# Patient Record
Sex: FEMALE | Race: WHITE | Employment: OTHER | URBAN - METROPOLITAN AREA
[De-identification: names, ages, dates, MRNs, and addresses within clinical notes are randomized per-mention and may not be internally consistent; named-entity substitution may affect disease eponyms.]

---

## 2017-03-11 ENCOUNTER — HOSPITAL ENCOUNTER (INPATIENT)
Facility: HOSPITAL | Age: 82
LOS: 3 days | Discharge: HOME HEALTH | DRG: 308 | End: 2017-03-16
Attending: EMERGENCY MEDICINE | Admitting: INTERNAL MEDICINE
Payer: MEDICARE

## 2017-03-11 ENCOUNTER — APPOINTMENT (OUTPATIENT)
Dept: GENERAL RADIOLOGY | Facility: HOSPITAL | Age: 82
DRG: 308 | End: 2017-03-11
Attending: EMERGENCY MEDICINE
Payer: MEDICARE

## 2017-03-11 DIAGNOSIS — N39.0 URINARY TRACT INFECTION, SITE UNSPECIFIED: ICD-10-CM

## 2017-03-11 DIAGNOSIS — R53.1 WEAKNESS GENERALIZED: Primary | ICD-10-CM

## 2017-03-11 PROBLEM — D64.9 ANEMIA: Status: ACTIVE | Noted: 2017-03-11

## 2017-03-11 PROBLEM — R79.89 AZOTEMIA: Status: ACTIVE | Noted: 2017-03-11

## 2017-03-11 PROBLEM — E87.1 HYPONATREMIA: Status: ACTIVE | Noted: 2017-03-11

## 2017-03-11 PROBLEM — R73.9 HYPERGLYCEMIA: Status: ACTIVE | Noted: 2017-03-11

## 2017-03-11 LAB
ALBUMIN SERPL-MCNC: 3.7 G/DL (ref 3.5–4.8)
ALP LIVER SERPL-CCNC: 95 U/L (ref 55–142)
ALT SERPL-CCNC: 17 U/L (ref 14–54)
APTT PPP: 28.6 SECONDS (ref 25–34)
AST SERPL-CCNC: 17 U/L (ref 15–41)
BASOPHILS # BLD AUTO: 0.01 X10(3) UL (ref 0–0.1)
BASOPHILS NFR BLD AUTO: 0.1 %
BILIRUB SERPL-MCNC: 0.4 MG/DL (ref 0.1–2)
BILIRUB UR QL STRIP.AUTO: NEGATIVE
BUN BLD-MCNC: 24 MG/DL (ref 8–20)
CALCIUM BLD-MCNC: 9 MG/DL (ref 8.3–10.3)
CHLORIDE: 100 MMOL/L (ref 101–111)
CO2: 24 MMOL/L (ref 22–32)
COLOR UR AUTO: YELLOW
CREAT BLD-MCNC: 0.78 MG/DL (ref 0.55–1.02)
EOSINOPHIL # BLD AUTO: 0 X10(3) UL (ref 0–0.3)
EOSINOPHIL NFR BLD AUTO: 0 %
ERYTHROCYTE [DISTWIDTH] IN BLOOD BY AUTOMATED COUNT: 13.7 % (ref 11.5–16)
GLUCOSE BLD-MCNC: 137 MG/DL (ref 70–99)
GLUCOSE UR STRIP.AUTO-MCNC: NEGATIVE MG/DL
HAV IGM SER QL: 2 MG/DL (ref 1.7–3)
HCT VFR BLD AUTO: 35.8 % (ref 34–50)
HGB BLD-MCNC: 11.8 G/DL (ref 12–16)
IMMATURE GRANULOCYTE COUNT: 0.02 X10(3) UL (ref 0–1)
IMMATURE GRANULOCYTE RATIO %: 0.2 %
KETONES UR STRIP.AUTO-MCNC: 20 MG/DL
LACTIC ACID: 1 MMOL/L (ref 0.5–2)
LYMPHOCYTES # BLD AUTO: 0.58 X10(3) UL (ref 0.9–4)
LYMPHOCYTES NFR BLD AUTO: 6.2 %
M PROTEIN MFR SERPL ELPH: 6.6 G/DL (ref 6.1–8.3)
MCH RBC QN AUTO: 29.1 PG (ref 27–33.2)
MCHC RBC AUTO-ENTMCNC: 33 G/DL (ref 31–37)
MCV RBC AUTO: 88.2 FL (ref 81–100)
MONOCYTES # BLD AUTO: 1.02 X10(3) UL (ref 0.1–0.6)
MONOCYTES NFR BLD AUTO: 10.8 %
NEUTROPHIL ABS PRELIM: 7.79 X10 (3) UL (ref 1.3–6.7)
NEUTROPHILS # BLD AUTO: 7.79 X10(3) UL (ref 1.3–6.7)
NEUTROPHILS NFR BLD AUTO: 82.7 %
NITRITE UR QL STRIP.AUTO: POSITIVE
PH UR STRIP.AUTO: 5 [PH] (ref 4.5–8)
PLATELET # BLD AUTO: 176 10(3)UL (ref 150–450)
POTASSIUM SERPL-SCNC: 4.1 MMOL/L (ref 3.6–5.1)
PROT UR STRIP.AUTO-MCNC: NEGATIVE MG/DL
RBC # BLD AUTO: 4.06 X10(6)UL (ref 3.8–5.1)
RED CELL DISTRIBUTION WIDTH-SD: 43.9 FL (ref 35.1–46.3)
SODIUM SERPL-SCNC: 135 MMOL/L (ref 136–144)
SP GR UR STRIP.AUTO: 1.02 (ref 1–1.03)
TROPONIN: 0.05 NG/ML (ref ?–0.05)
TSI SER-ACNC: 0.47 MIU/ML (ref 0.35–5.5)
UROBILINOGEN UR STRIP.AUTO-MCNC: <2 MG/DL
WBC # BLD AUTO: 9.4 X10(3) UL (ref 4–13)

## 2017-03-11 PROCEDURE — 87430 STREP A AG IA: CPT | Performed by: EMERGENCY MEDICINE

## 2017-03-11 PROCEDURE — 84484 ASSAY OF TROPONIN QUANT: CPT | Performed by: EMERGENCY MEDICINE

## 2017-03-11 PROCEDURE — 93005 ELECTROCARDIOGRAM TRACING: CPT

## 2017-03-11 PROCEDURE — 87040 BLOOD CULTURE FOR BACTERIA: CPT | Performed by: EMERGENCY MEDICINE

## 2017-03-11 PROCEDURE — 99291 CRITICAL CARE FIRST HOUR: CPT

## 2017-03-11 PROCEDURE — 93010 ELECTROCARDIOGRAM REPORT: CPT

## 2017-03-11 PROCEDURE — 81001 URINALYSIS AUTO W/SCOPE: CPT | Performed by: EMERGENCY MEDICINE

## 2017-03-11 PROCEDURE — 71020 XR CHEST PA + LAT CHEST (CPT=71020): CPT

## 2017-03-11 PROCEDURE — 96375 TX/PRO/DX INJ NEW DRUG ADDON: CPT

## 2017-03-11 PROCEDURE — 87999 UNLISTED MICROBIOLOGY PX: CPT

## 2017-03-11 PROCEDURE — 83735 ASSAY OF MAGNESIUM: CPT | Performed by: EMERGENCY MEDICINE

## 2017-03-11 PROCEDURE — 87502 INFLUENZA DNA AMP PROBE: CPT | Performed by: EMERGENCY MEDICINE

## 2017-03-11 PROCEDURE — 83605 ASSAY OF LACTIC ACID: CPT | Performed by: EMERGENCY MEDICINE

## 2017-03-11 PROCEDURE — 80053 COMPREHEN METABOLIC PANEL: CPT | Performed by: EMERGENCY MEDICINE

## 2017-03-11 PROCEDURE — 87088 URINE BACTERIA CULTURE: CPT | Performed by: EMERGENCY MEDICINE

## 2017-03-11 PROCEDURE — 87186 SC STD MICRODIL/AGAR DIL: CPT | Performed by: EMERGENCY MEDICINE

## 2017-03-11 PROCEDURE — 87086 URINE CULTURE/COLONY COUNT: CPT | Performed by: EMERGENCY MEDICINE

## 2017-03-11 PROCEDURE — 87798 DETECT AGENT NOS DNA AMP: CPT | Performed by: EMERGENCY MEDICINE

## 2017-03-11 PROCEDURE — 85730 THROMBOPLASTIN TIME PARTIAL: CPT | Performed by: EMERGENCY MEDICINE

## 2017-03-11 PROCEDURE — 96365 THER/PROPH/DIAG IV INF INIT: CPT

## 2017-03-11 PROCEDURE — 84443 ASSAY THYROID STIM HORMONE: CPT | Performed by: EMERGENCY MEDICINE

## 2017-03-11 PROCEDURE — 85025 COMPLETE CBC W/AUTO DIFF WBC: CPT | Performed by: EMERGENCY MEDICINE

## 2017-03-11 PROCEDURE — 36415 COLL VENOUS BLD VENIPUNCTURE: CPT

## 2017-03-11 PROCEDURE — 87081 CULTURE SCREEN ONLY: CPT | Performed by: EMERGENCY MEDICINE

## 2017-03-11 RX ORDER — HEPARIN SODIUM AND DEXTROSE 10000; 5 [USP'U]/100ML; G/100ML
12 INJECTION INTRAVENOUS ONCE
Status: COMPLETED | OUTPATIENT
Start: 2017-03-11 | End: 2017-03-12

## 2017-03-11 RX ORDER — HEPARIN SODIUM AND DEXTROSE 10000; 5 [USP'U]/100ML; G/100ML
INJECTION INTRAVENOUS CONTINUOUS
Status: CANCELLED | OUTPATIENT
Start: 2017-03-12

## 2017-03-11 RX ORDER — DILTIAZEM HYDROCHLORIDE 5 MG/ML
10 INJECTION INTRAVENOUS
Status: ACTIVE | OUTPATIENT
Start: 2017-03-11 | End: 2017-03-12

## 2017-03-11 RX ORDER — DILTIAZEM HYDROCHLORIDE 5 MG/ML
INJECTION INTRAVENOUS
Status: COMPLETED
Start: 2017-03-11 | End: 2017-03-11

## 2017-03-11 RX ORDER — SODIUM CHLORIDE 9 MG/ML
1000 INJECTION, SOLUTION INTRAVENOUS CONTINUOUS
Status: ACTIVE | OUTPATIENT
Start: 2017-03-11 | End: 2017-03-12

## 2017-03-11 RX ORDER — SODIUM CHLORIDE 9 MG/ML
INJECTION, SOLUTION INTRAVENOUS ONCE
Status: DISCONTINUED | OUTPATIENT
Start: 2017-03-11 | End: 2017-03-16

## 2017-03-11 RX ORDER — HEPARIN SODIUM 5000 [USP'U]/ML
60 INJECTION INTRAVENOUS; SUBCUTANEOUS ONCE
Status: COMPLETED | OUTPATIENT
Start: 2017-03-11 | End: 2017-03-11

## 2017-03-12 ENCOUNTER — APPOINTMENT (OUTPATIENT)
Dept: CV DIAGNOSTICS | Facility: HOSPITAL | Age: 82
DRG: 308 | End: 2017-03-12
Attending: INTERNAL MEDICINE
Payer: MEDICARE

## 2017-03-12 LAB
APTT PPP: 129.7 SECONDS (ref 25–34)
APTT PPP: 74 SECONDS (ref 25–34)
BASOPHILS # BLD AUTO: 0.01 X10(3) UL (ref 0–0.1)
BASOPHILS NFR BLD AUTO: 0.1 %
BUN BLD-MCNC: 22 MG/DL (ref 8–20)
CALCIUM BLD-MCNC: 7.7 MG/DL (ref 8.3–10.3)
CHLORIDE: 107 MMOL/L (ref 101–111)
CO2: 20 MMOL/L (ref 22–32)
CREAT BLD-MCNC: 0.57 MG/DL (ref 0.55–1.02)
EOSINOPHIL # BLD AUTO: 0 X10(3) UL (ref 0–0.3)
EOSINOPHIL NFR BLD AUTO: 0 %
ERYTHROCYTE [DISTWIDTH] IN BLOOD BY AUTOMATED COUNT: 13.6 % (ref 11.5–16)
GLUCOSE BLD-MCNC: 82 MG/DL (ref 70–99)
HCT VFR BLD AUTO: 34.2 % (ref 34–50)
HGB BLD-MCNC: 11.3 G/DL (ref 12–16)
IMMATURE GRANULOCYTE COUNT: 0.04 X10(3) UL (ref 0–1)
IMMATURE GRANULOCYTE RATIO %: 0.5 %
LYMPHOCYTES # BLD AUTO: 1.09 X10(3) UL (ref 0.9–4)
LYMPHOCYTES NFR BLD AUTO: 13.1 %
MCH RBC QN AUTO: 29.5 PG (ref 27–33.2)
MCHC RBC AUTO-ENTMCNC: 33 G/DL (ref 31–37)
MCV RBC AUTO: 89.3 FL (ref 81–100)
MONOCYTES # BLD AUTO: 0.79 X10(3) UL (ref 0.1–0.6)
MONOCYTES NFR BLD AUTO: 9.5 %
NEUTROPHIL ABS PRELIM: 6.38 X10 (3) UL (ref 1.3–6.7)
NEUTROPHILS # BLD AUTO: 6.38 X10(3) UL (ref 1.3–6.7)
NEUTROPHILS NFR BLD AUTO: 76.8 %
PLATELET # BLD AUTO: 153 10(3)UL (ref 150–450)
POTASSIUM SERPL-SCNC: 4 MMOL/L (ref 3.6–5.1)
RBC # BLD AUTO: 3.83 X10(6)UL (ref 3.8–5.1)
RED CELL DISTRIBUTION WIDTH-SD: 44.1 FL (ref 35.1–46.3)
SODIUM SERPL-SCNC: 138 MMOL/L (ref 136–144)
TROPONIN: 0.1 NG/ML (ref ?–0.05)
WBC # BLD AUTO: 8.3 X10(3) UL (ref 4–13)

## 2017-03-12 PROCEDURE — 93306 TTE W/DOPPLER COMPLETE: CPT | Performed by: INTERNAL MEDICINE

## 2017-03-12 PROCEDURE — 85730 THROMBOPLASTIN TIME PARTIAL: CPT | Performed by: HOSPITALIST

## 2017-03-12 PROCEDURE — 80048 BASIC METABOLIC PNL TOTAL CA: CPT | Performed by: HOSPITALIST

## 2017-03-12 PROCEDURE — 93005 ELECTROCARDIOGRAM TRACING: CPT

## 2017-03-12 PROCEDURE — 97530 THERAPEUTIC ACTIVITIES: CPT

## 2017-03-12 PROCEDURE — 93010 ELECTROCARDIOGRAM REPORT: CPT | Performed by: INTERNAL MEDICINE

## 2017-03-12 PROCEDURE — 97167 OT EVAL HIGH COMPLEX 60 MIN: CPT

## 2017-03-12 PROCEDURE — 97162 PT EVAL MOD COMPLEX 30 MIN: CPT

## 2017-03-12 PROCEDURE — 93306 TTE W/DOPPLER COMPLETE: CPT

## 2017-03-12 PROCEDURE — 85025 COMPLETE CBC W/AUTO DIFF WBC: CPT | Performed by: HOSPITALIST

## 2017-03-12 PROCEDURE — 84484 ASSAY OF TROPONIN QUANT: CPT | Performed by: INTERNAL MEDICINE

## 2017-03-12 PROCEDURE — 97535 SELF CARE MNGMENT TRAINING: CPT

## 2017-03-12 RX ORDER — ENOXAPARIN SODIUM 100 MG/ML
40 INJECTION SUBCUTANEOUS DAILY
Status: DISCONTINUED | OUTPATIENT
Start: 2017-03-12 | End: 2017-03-12

## 2017-03-12 RX ORDER — DIPHENHYDRAMINE HCL 50 MG
50 CAPSULE ORAL NIGHTLY PRN
COMMUNITY
End: 2017-05-04

## 2017-03-12 RX ORDER — AMIODARONE HYDROCHLORIDE 200 MG/1
200 TABLET ORAL 3 TIMES DAILY
Status: DISCONTINUED | OUTPATIENT
Start: 2017-03-12 | End: 2017-03-16

## 2017-03-12 RX ORDER — HEPARIN SODIUM AND DEXTROSE 10000; 5 [USP'U]/100ML; G/100ML
INJECTION INTRAVENOUS CONTINUOUS PRN
Status: DISCONTINUED | OUTPATIENT
Start: 2017-03-12 | End: 2017-03-16

## 2017-03-12 RX ORDER — ASPIRIN 81 MG/1
81 TABLET ORAL DAILY
Status: DISCONTINUED | OUTPATIENT
Start: 2017-03-12 | End: 2017-03-16

## 2017-03-12 RX ORDER — BENZONATATE 200 MG/1
200 CAPSULE ORAL 3 TIMES DAILY PRN
Status: DISCONTINUED | OUTPATIENT
Start: 2017-03-12 | End: 2017-03-13

## 2017-03-12 RX ORDER — ACETAMINOPHEN 325 MG/1
650 TABLET ORAL EVERY 6 HOURS PRN
Status: DISCONTINUED | OUTPATIENT
Start: 2017-03-12 | End: 2017-03-12

## 2017-03-12 RX ORDER — MAGNESIUM OXIDE 400 MG (241.3 MG MAGNESIUM) TABLET
2 TABLET NIGHTLY PRN
COMMUNITY

## 2017-03-12 RX ORDER — ACETAMINOPHEN 325 MG/1
650 TABLET ORAL EVERY 4 HOURS PRN
Status: DISCONTINUED | OUTPATIENT
Start: 2017-03-12 | End: 2017-03-16

## 2017-03-12 NOTE — CM/SW NOTE
03/12/17 1200   CM/SW Referral Data   Referral Source Physician   Reason for Referral Discharge planning;Psychoscial assessment   Informant Patient   Pertinent Medical Hx   Primary Care Physician Name dr Elder Guillory Past Medical/Mental He

## 2017-03-12 NOTE — PLAN OF CARE
NURSING ADMISSION NOTE      Patient admitted via Cart. Oriented to room. Safety precautions initiated. Bed in low position. Call light in reach. Admission navigator completed.

## 2017-03-12 NOTE — PHYSICAL THERAPY NOTE
PHYSICAL THERAPY EVALUATION - INPATIENT     Room Number: 0801/9993-T  Evaluation Date: 3/12/2017  Type of Evaluation: Initial  Physician Order: PT Eval and Treat    Presenting Problem: generalized weakness  Reason for Therapy: Mobility Dysfunction and receives assist with ADL, uses RW and does walk some alone in building and outside using RW    SUBJECTIVE  Reports that she does lean back with initial standing but that is why she has help     Patient self-stated goal is return home to therapy at [address] [place] [address] STATUS  Gait Assessment   Gait Assistance: Maximum assistance  Distance (ft): 15  Assistive Device: Rolling walker  Pattern: Shuffle  Stoop/Curb Assistance: Not tested       Skilled Therapy Provided: PT evaluation completed.   RW gait in room with CGA   Ret

## 2017-03-12 NOTE — ED PROVIDER NOTES
Patient Seen in: [facility] Emergency Department    History   Patient presents with:  Fatigue (constitutional, neurologic)  Fever Sepsis (infectious)    Stated Complaint: WEAKNESS/FEVER    HPI    Patient is an 42-year-old female comes into emergency mouth.   LONGS ADULT LOW STRENGTH ASA 81 MG OR TBEC,  1 TABLET DAILY   FISH OIL,  1 DAILY   CALCIUM + D OR,  Take 2 Tabs by mouth daily.        Family History   Problem Relation Age of Onset   • duodenal carcinoma[other] [OTHER] Mother    • Cancer Mother calf tenderness. Dorsalis and Posterior Tibial pulses present. No clubbing. No cyanosis. 1+ bilateral lower extremity edema  SKIN EXAMINATIoN: Warm and dry with normal appearance. No rashes or lesions. NEUROLOGICAL:  Motor strength intact all groups. axes as noted on EKG Report. Rate: 80  Rhythm: Sinus Rhythm  Reading: Sinus arrhythmia without acute change          EKG #2 EKG  Rate, Axis and intervals as noted. I agree with computer interpretation. Rate: 130  Rhythm: Atrial fibrillation  Reading:  At cardiology, Dr. Lobo Holt. Patient denies history of atrial fibrillation. MDM     66-year-old female with generalized weakness secondary to urinary tract infection. Normal lactate.   Attempted to ambulate patient and apparently she was unsteady with her wal

## 2017-03-12 NOTE — ED NOTES
Family remains at the bedside, patient sleeping, arouses easily and denies c/o at this time, respirations normal, skin p/w/d

## 2017-03-12 NOTE — ED NOTES
Patient able to stand and walk with walker but her gait is unsteady. She states she is feeling better but remains weak.

## 2017-03-12 NOTE — CONSULTS
[facility] [address] [place] [address] Cardiology  Report of Consultation    Saul Rocha Patient Status:  Inpatient    1929 MRN [id number]   [facility] 3NE-A Attending Laura Richard MD   Hosp Day # 1 PCP Jason Lundberg MD     Reason for C duodenal carcinoma[other] [OTHER] Mother    • Cancer Mother      stomach   • melanoma[other] [OTHER] Brother      X2   • Cancer Brother      melanoma and pancreatic   • colon cancer[other] [OTHER] Other      M A x3, MU x1   • Cancer Father      melanoma kg)  08/16/16 : 127 lb (57.607 kg)          General: Well developed, well nourished elderly female. Pt is in no acute distress. HEENT:   Normocephalic. Atraumatic. Eyes with no scleral icterus. Neck: Supple. No JVD.   Carotids 2+ and equal in symmetri Past H/O TIA per records - pt / family do not recall  4. UTI  5. Anemia    -Stable  6. Troponin minimally elevated      Plan:  1. Amio to maintain SR  2.  Beta-blockade  3. Tele monitor  4. TFTs  5.  Echo  6. IV Heparin at this time  7.   Will contin

## 2017-03-12 NOTE — ED INITIAL ASSESSMENT (HPI)
Pt to ed from home assisted living with c/o weakness,fever, pt sx present last 24 hrs,pt also has sore throat.

## 2017-03-12 NOTE — OCCUPATIONAL THERAPY NOTE
OCCUPATIONAL THERAPY EVALUATION - INPATIENT     Room Number: 3417/8379-G  Evaluation Date: 3/12/2017  Type of Evaluation: Quick Eval  Presenting Problem: gerneralized weakness    Physician Order: IP Consult to Occupational Therapy  Reason for Therapy: ADL/ chair  Other Equipment: Hospital bed    Occupation/Status: retired  Hand Dominance: Right  Drives: No  Patient Regularly Uses: None    Prior Level of [place]: Pt reports receiving assistance for dressing, bathing, toileting, grooming and IADLs.   Pt re brushing teeth?: A Little  -   Eating meals?: A Little    AM-PAC Score:  Score: 16  Approx Degree of Impairment: 53.32%  Standardized Score (AM-PAC Scale): 35.96  CMS Modifier (G-Code): CK    FUNCTIONAL TRANSFER ASSESSMENT  Supine to Sit : Minimum assistan previous function level  Pt performs Bathing at previous function level

## 2017-03-12 NOTE — ED NOTES
Family at the bedside. Patient denies c/o palpitations or ELISHA. MD notified of change in cardiac rhythm.

## 2017-03-12 NOTE — PLAN OF CARE
Pt alert and oriented, some forgetfulness noted at times. Denies pain. No c/o dizziness, SOB, nausea. Cardizem gtt dc'd per cardiology. Heparin gtt, IVF infusing. NSR on tele. Afebrile. Echo completed. Will monitor closely.     CARDIOVASCULAR - ADULT    • M

## 2017-03-12 NOTE — H&P
[facility] Hospitalist History and Physical      Patient presents with:  Fatigue (constitutional, neurologic)  Fever Sepsis (infectious)       PCP: Maximus Mccabe MD      History of Present Illness: Patient is a 80year old female with PMH sig for osteopo on her record. Was treated with this post oral surgery. Fosamax                     Comment:Does not remember reaction it was a long time ago.   Macrobid [employer]*    Other (See Comments)    Comment:Reports severe abd cramping and fatigue Clear to auscultation bilaterally. Normal effort   Chest wall:  No tenderness or deformity. Heart:  Irregular, S1/S2 normal no murmur, rub or gallop appreciated   Abdomen:   Soft, +suprapubic tenderness. Bowel sounds present.   No masses,  No organomegaly performed 11/10/11.      3/11/2017  CONCLUSION:  1. Diffuse increased interstitial opacities most likely chronic, a superimposed edematous or infectious process cannot be excluded, correlate clinically. No focal consolidation appreciated. Details as above.

## 2017-03-13 ENCOUNTER — APPOINTMENT (OUTPATIENT)
Dept: GENERAL RADIOLOGY | Facility: HOSPITAL | Age: 82
DRG: 308 | End: 2017-03-13
Attending: HOSPITALIST
Payer: MEDICARE

## 2017-03-13 LAB
APTT PPP: 51.1 SECONDS (ref 25–34)
APTT PPP: 82.6 SECONDS (ref 25–34)
ATRIAL RATE: 153 BPM
ATRIAL RATE: 153 BPM
ATRIAL RATE: 80 BPM
ATRIAL RATE: 95 BPM
BASOPHILS # BLD AUTO: 0.01 X10(3) UL (ref 0–0.1)
BASOPHILS NFR BLD AUTO: 0.2 %
BUN BLD-MCNC: 17 MG/DL (ref 8–20)
CALCIUM BLD-MCNC: 7.4 MG/DL (ref 8.3–10.3)
CHLORIDE: 108 MMOL/L (ref 101–111)
CO2: 19 MMOL/L (ref 22–32)
CREAT BLD-MCNC: 0.54 MG/DL (ref 0.55–1.02)
EOSINOPHIL # BLD AUTO: 0 X10(3) UL (ref 0–0.3)
EOSINOPHIL NFR BLD AUTO: 0 %
ERYTHROCYTE [DISTWIDTH] IN BLOOD BY AUTOMATED COUNT: 13.8 % (ref 11.5–16)
FREE T4: 1.3 NG/DL (ref 0.9–1.8)
GLUCOSE BLD-MCNC: 78 MG/DL (ref 70–99)
HCT VFR BLD AUTO: 29.8 % (ref 34–50)
HGB BLD-MCNC: 9.9 G/DL (ref 12–16)
IMMATURE GRANULOCYTE COUNT: 0.02 X10(3) UL (ref 0–1)
IMMATURE GRANULOCYTE RATIO %: 0.3 %
LYMPHOCYTES # BLD AUTO: 1.13 X10(3) UL (ref 0.9–4)
LYMPHOCYTES NFR BLD AUTO: 17.2 %
MCH RBC QN AUTO: 29.3 PG (ref 27–33.2)
MCHC RBC AUTO-ENTMCNC: 33.2 G/DL (ref 31–37)
MCV RBC AUTO: 88.2 FL (ref 81–100)
MONOCYTES # BLD AUTO: 0.67 X10(3) UL (ref 0.1–0.6)
MONOCYTES NFR BLD AUTO: 10.2 %
NEUTROPHIL ABS PRELIM: 4.74 X10 (3) UL (ref 1.3–6.7)
NEUTROPHILS # BLD AUTO: 4.74 X10(3) UL (ref 1.3–6.7)
NEUTROPHILS NFR BLD AUTO: 72.1 %
P AXIS: 41 DEGREES
P AXIS: 71 DEGREES
P-R INTERVAL: 132 MS
P-R INTERVAL: 134 MS
PLATELET # BLD AUTO: 139 10(3)UL (ref 150–450)
PLATELET # BLD AUTO: 142 10(3)UL (ref 150–450)
POTASSIUM SERPL-SCNC: 3.3 MMOL/L (ref 3.6–5.1)
POTASSIUM SERPL-SCNC: 3.4 MMOL/L (ref 3.6–5.1)
POTASSIUM SERPL-SCNC: 3.6 MMOL/L (ref 3.6–5.1)
PRO-BETA NATRIURETIC PEPTIDE: 2608 PG/ML (ref ?–450)
Q-T INTERVAL: 314 MS
Q-T INTERVAL: 324 MS
Q-T INTERVAL: 364 MS
Q-T INTERVAL: 386 MS
QRS DURATION: 78 MS
QRS DURATION: 80 MS
QRS DURATION: 82 MS
QRS DURATION: 88 MS
QTC CALCULATION (BEZET): 445 MS
QTC CALCULATION (BEZET): 457 MS
QTC CALCULATION (BEZET): 476 MS
QTC CALCULATION (BEZET): 492 MS
R AXIS: -10 DEGREES
R AXIS: -18 DEGREES
R AXIS: 17 DEGREES
R AXIS: 3 DEGREES
RBC # BLD AUTO: 3.38 X10(6)UL (ref 3.8–5.1)
RED CELL DISTRIBUTION WIDTH-SD: 44.7 FL (ref 35.1–46.3)
SODIUM SERPL-SCNC: 137 MMOL/L (ref 136–144)
T AXIS: 187 DEGREES
T AXIS: 33 DEGREES
T AXIS: 4 DEGREES
T AXIS: 63 DEGREES
TROPONIN: 0.09 NG/ML (ref ?–0.05)
TSI SER-ACNC: 1.62 MIU/ML (ref 0.35–5.5)
VENTRICULAR RATE: 130 BPM
VENTRICULAR RATE: 148 BPM
VENTRICULAR RATE: 80 BPM
VENTRICULAR RATE: 95 BPM
WBC # BLD AUTO: 6.6 X10(3) UL (ref 4–13)

## 2017-03-13 PROCEDURE — 71020 XR CHEST PA + LAT CHEST (CPT=71020): CPT

## 2017-03-13 PROCEDURE — 80048 BASIC METABOLIC PNL TOTAL CA: CPT | Performed by: HOSPITALIST

## 2017-03-13 PROCEDURE — 84439 ASSAY OF FREE THYROXINE: CPT | Performed by: INTERNAL MEDICINE

## 2017-03-13 PROCEDURE — 85730 THROMBOPLASTIN TIME PARTIAL: CPT | Performed by: HOSPITALIST

## 2017-03-13 PROCEDURE — 85730 THROMBOPLASTIN TIME PARTIAL: CPT | Performed by: INTERNAL MEDICINE

## 2017-03-13 PROCEDURE — 97116 GAIT TRAINING THERAPY: CPT

## 2017-03-13 PROCEDURE — 85049 AUTOMATED PLATELET COUNT: CPT | Performed by: HOSPITALIST

## 2017-03-13 PROCEDURE — 85025 COMPLETE CBC W/AUTO DIFF WBC: CPT | Performed by: HOSPITALIST

## 2017-03-13 PROCEDURE — 84132 ASSAY OF SERUM POTASSIUM: CPT | Performed by: HOSPITALIST

## 2017-03-13 PROCEDURE — 83880 ASSAY OF NATRIURETIC PEPTIDE: CPT | Performed by: HOSPITALIST

## 2017-03-13 PROCEDURE — 84484 ASSAY OF TROPONIN QUANT: CPT | Performed by: INTERNAL MEDICINE

## 2017-03-13 PROCEDURE — 97110 THERAPEUTIC EXERCISES: CPT

## 2017-03-13 PROCEDURE — 84443 ASSAY THYROID STIM HORMONE: CPT | Performed by: INTERNAL MEDICINE

## 2017-03-13 RX ORDER — POTASSIUM CHLORIDE 20 MEQ/1
40 TABLET, EXTENDED RELEASE ORAL EVERY 4 HOURS
Status: COMPLETED | OUTPATIENT
Start: 2017-03-13 | End: 2017-03-13

## 2017-03-13 RX ORDER — FUROSEMIDE 10 MG/ML
20 INJECTION INTRAMUSCULAR; INTRAVENOUS ONCE
Status: COMPLETED | OUTPATIENT
Start: 2017-03-13 | End: 2017-03-13

## 2017-03-13 RX ORDER — FUROSEMIDE 10 MG/ML
40 INJECTION INTRAMUSCULAR; INTRAVENOUS ONCE
Status: COMPLETED | OUTPATIENT
Start: 2017-03-13 | End: 2017-03-13

## 2017-03-13 RX ORDER — BENZONATATE 200 MG/1
200 CAPSULE ORAL 3 TIMES DAILY
Status: DISCONTINUED | OUTPATIENT
Start: 2017-03-14 | End: 2017-03-16

## 2017-03-13 NOTE — CERTIFICATION
**Certification    PHYSICIAN Certification of Need for Inpatient Hospitalization    Based on the her current state of illness, Hua Baker requires inpatient hospitalization for her weakness, UTI, elevated troponin.   This requires inpatient medical charly

## 2017-03-13 NOTE — PHYSICAL THERAPY NOTE
PHYSICAL THERAPY TREATMENT NOTE - INPATIENT    Room Number: 0704/9092-G     Session: 1   Number of Visits to Meet Established Goals: 5    Presenting Problem: generalized weakness  History related to current admission: Presented to ED with weakness &fever Sitting: Good  Dynamic Sitting: Not tested           Static Standing: Poor +  Dynamic Standing: Poor +    ACTIVITY TOLERANCE  O2 Saturation: 94-97%  Room air  No shortness of breath    AM-PAC '6-Clicks' INPATIENT SHORT FORM - BASIC MOBILITY  How much diffi within reach;RN aware of session/findings; All patient questions and concerns addressed    ASSESSMENT   Pt demonstrating progress today, able to amb with rw with min a 150', tolerate le therex.   Pt cont to present with dec strength, balance, activity tolera

## 2017-03-13 NOTE — PROGRESS NOTES
[facility] Hospitalist Progress Note                                                                   [address] [facility],Third [address]  4/22/1929    CC: F/U UTI, A fib with RVR    SUBJECTIVE:    Overnight noted t osteoporosis, hx of HTN (not on meds), hx of SIADH who presented for weakness and fever.  Found to have UTI and newly diagnosed atrial fibrillation with RVR    UTI  -Patient with suprapubic tenderness on exam, urine culture with E. Coli, resistant to bactri

## 2017-03-13 NOTE — PROGRESS NOTES
[facility] Cardiology  Progress Note    Olene Rape Patient Status:  Inpatient    1929 MRN [id number]   [facility] 3NE-A Attending Renetta Sotelo MD   Hosp Day # 2 PCP Eliseo Post MD     Subjective:   Feels a Well-nourished. No acute distress. HEENT:  Normocephalic. Atraumatic. No icterus. Neck:  There is no jugular venous distention. Cardiovascular:  Cardiovascular examination demonstrates a regular rate and rhythm. There is normal S1, S2.   There is no Wall thickness was normal.     Systolic function was normal. The estimated ejection fraction was 55-60%.      There was no diagnostic evidence for regional wall motion abnormalities.     Doppler parameters are consistent with abnormal left ventricular     r

## 2017-03-13 NOTE — PROGRESS NOTES
Paging Dr. Khushboo Obando for orders. O2 level to 87%, put on 1 liter NC-currently on 1L -94%, fluids still to be running-order needs clarification. Pt. States she does feel SOB when she is coughing.   Video swallow in am?  Repeat chest x-ray in am?    Spoke wi

## 2017-03-14 LAB
APTT PPP: 39.8 SECONDS (ref 25–34)
APTT PPP: 48.1 SECONDS (ref 25–34)
BASOPHILS # BLD AUTO: 0.01 X10(3) UL (ref 0–0.1)
BASOPHILS NFR BLD AUTO: 0.2 %
BUN BLD-MCNC: 16 MG/DL (ref 8–20)
CALCIUM BLD-MCNC: 7.4 MG/DL (ref 8.3–10.3)
CHLORIDE: 105 MMOL/L (ref 101–111)
CO2: 22 MMOL/L (ref 22–32)
CREAT BLD-MCNC: 0.46 MG/DL (ref 0.55–1.02)
EOSINOPHIL # BLD AUTO: 0.01 X10(3) UL (ref 0–0.3)
EOSINOPHIL NFR BLD AUTO: 0.2 %
ERYTHROCYTE [DISTWIDTH] IN BLOOD BY AUTOMATED COUNT: 13.6 % (ref 11.5–16)
GLUCOSE BLD-MCNC: 89 MG/DL (ref 70–99)
HCT VFR BLD AUTO: 32.2 % (ref 34–50)
HGB BLD-MCNC: 10.8 G/DL (ref 12–16)
IMMATURE GRANULOCYTE COUNT: 0.03 X10(3) UL (ref 0–1)
IMMATURE GRANULOCYTE RATIO %: 0.5 %
LYMPHOCYTES # BLD AUTO: 0.78 X10(3) UL (ref 0.9–4)
LYMPHOCYTES NFR BLD AUTO: 13.7 %
MCH RBC QN AUTO: 29.1 PG (ref 27–33.2)
MCHC RBC AUTO-ENTMCNC: 33.5 G/DL (ref 31–37)
MCV RBC AUTO: 86.8 FL (ref 81–100)
MONOCYTES # BLD AUTO: 0.54 X10(3) UL (ref 0.1–0.6)
MONOCYTES NFR BLD AUTO: 9.5 %
NEUTROPHIL ABS PRELIM: 4.34 X10 (3) UL (ref 1.3–6.7)
NEUTROPHILS # BLD AUTO: 4.34 X10(3) UL (ref 1.3–6.7)
NEUTROPHILS NFR BLD AUTO: 75.9 %
PLATELET # BLD AUTO: 150 10(3)UL (ref 150–450)
POTASSIUM SERPL-SCNC: 3.5 MMOL/L (ref 3.6–5.1)
POTASSIUM SERPL-SCNC: 4.4 MMOL/L (ref 3.6–5.1)
RBC # BLD AUTO: 3.71 X10(6)UL (ref 3.8–5.1)
RED CELL DISTRIBUTION WIDTH-SD: 42.5 FL (ref 35.1–46.3)
SODIUM SERPL-SCNC: 138 MMOL/L (ref 136–144)
WBC # BLD AUTO: 5.7 X10(3) UL (ref 4–13)

## 2017-03-14 PROCEDURE — 85730 THROMBOPLASTIN TIME PARTIAL: CPT | Performed by: HOSPITALIST

## 2017-03-14 PROCEDURE — 80048 BASIC METABOLIC PNL TOTAL CA: CPT | Performed by: HOSPITALIST

## 2017-03-14 PROCEDURE — 84132 ASSAY OF SERUM POTASSIUM: CPT | Performed by: HOSPITALIST

## 2017-03-14 PROCEDURE — 85025 COMPLETE CBC W/AUTO DIFF WBC: CPT | Performed by: HOSPITALIST

## 2017-03-14 RX ORDER — POTASSIUM CHLORIDE 20 MEQ/1
40 TABLET, EXTENDED RELEASE ORAL EVERY 4 HOURS
Status: COMPLETED | OUTPATIENT
Start: 2017-03-14 | End: 2017-03-14

## 2017-03-14 RX ORDER — FUROSEMIDE 10 MG/ML
20 INJECTION INTRAMUSCULAR; INTRAVENOUS ONCE
Status: COMPLETED | OUTPATIENT
Start: 2017-03-14 | End: 2017-03-14

## 2017-03-14 RX ORDER — DILTIAZEM HYDROCHLORIDE 5 MG/ML
20 INJECTION INTRAVENOUS ONCE
Status: COMPLETED | OUTPATIENT
Start: 2017-03-14 | End: 2017-03-14

## 2017-03-14 NOTE — PLAN OF CARE
Assumed patient care at 0. Patient A&O x 4 with some confusion and forgetfulness present at times. No complaints of pain or discomfort at this time. VSS. Tele- NSR.  Heparin drip running at 400units/hour; re-draw in AM. Minimally wet cough noted for shelly

## 2017-03-14 NOTE — PROGRESS NOTES
[facility] Cardiology  Progress Note    Gamaliel Sandoval Patient Status:  Inpatient    1929 MRN [id number]   [facility] 3NE-A Attending Cindy Archibald MD   Hosp Day # 3 PCP Efra Vicente MD     Subjective:   Feels a Normocephalic. Atraumatic. No icterus. Neck:  There is no jugular venous distention. Cardiovascular:  Cardiovascular examination demonstrates a regular rate and rhythm. There is normal S1, S2. There is no S3 or S4.   There are no murmurs, rubs, or ga input(s): PTP, INR in the last 72 hours. Recent Labs   Lab  03/11/17   1954  03/12/17   1136  03/13/17   0508   TROP  0.046*  0.095*  0.094*         Tele: SR.  PAF. Mild pause / frannie with conversion to SR. Diagnostics:    Echo:   Conclusions:    1.

## 2017-03-15 ENCOUNTER — APPOINTMENT (OUTPATIENT)
Dept: CV DIAGNOSTICS | Facility: HOSPITAL | Age: 82
DRG: 308 | End: 2017-03-15
Attending: INTERNAL MEDICINE
Payer: MEDICARE

## 2017-03-15 LAB
APTT PPP: 71.4 SECONDS (ref 25–34)
BASOPHILS # BLD AUTO: 0.01 X10(3) UL (ref 0–0.1)
BASOPHILS NFR BLD AUTO: 0.2 %
BUN BLD-MCNC: 16 MG/DL (ref 8–20)
CALCIUM BLD-MCNC: 7.7 MG/DL (ref 8.3–10.3)
CHLORIDE: 104 MMOL/L (ref 101–111)
CO2: 20 MMOL/L (ref 22–32)
CREAT BLD-MCNC: 0.63 MG/DL (ref 0.55–1.02)
EOSINOPHIL # BLD AUTO: 0 X10(3) UL (ref 0–0.3)
EOSINOPHIL NFR BLD AUTO: 0 %
ERYTHROCYTE [DISTWIDTH] IN BLOOD BY AUTOMATED COUNT: 13.9 % (ref 11.5–16)
GLUCOSE BLD-MCNC: 91 MG/DL (ref 70–99)
HCT VFR BLD AUTO: 32.9 % (ref 34–50)
HGB BLD-MCNC: 11.2 G/DL (ref 12–16)
IMMATURE GRANULOCYTE COUNT: 0.03 X10(3) UL (ref 0–1)
IMMATURE GRANULOCYTE RATIO %: 0.6 %
LYMPHOCYTES # BLD AUTO: 1.08 X10(3) UL (ref 0.9–4)
LYMPHOCYTES NFR BLD AUTO: 21.6 %
MCH RBC QN AUTO: 29.4 PG (ref 27–33.2)
MCHC RBC AUTO-ENTMCNC: 34 G/DL (ref 31–37)
MCV RBC AUTO: 86.4 FL (ref 81–100)
MONOCYTES # BLD AUTO: 0.64 X10(3) UL (ref 0.1–0.6)
MONOCYTES NFR BLD AUTO: 12.8 %
NEUTROPHIL ABS PRELIM: 3.23 X10 (3) UL (ref 1.3–6.7)
NEUTROPHILS # BLD AUTO: 3.23 X10(3) UL (ref 1.3–6.7)
NEUTROPHILS NFR BLD AUTO: 64.8 %
PLATELET # BLD AUTO: 156 10(3)UL (ref 150–450)
POTASSIUM SERPL-SCNC: 4.2 MMOL/L (ref 3.6–5.1)
RBC # BLD AUTO: 3.81 X10(6)UL (ref 3.8–5.1)
RED CELL DISTRIBUTION WIDTH-SD: 43.7 FL (ref 35.1–46.3)
SODIUM SERPL-SCNC: 134 MMOL/L (ref 136–144)
WBC # BLD AUTO: 5 X10(3) UL (ref 4–13)

## 2017-03-15 PROCEDURE — 78452 HT MUSCLE IMAGE SPECT MULT: CPT

## 2017-03-15 PROCEDURE — 85025 COMPLETE CBC W/AUTO DIFF WBC: CPT | Performed by: HOSPITALIST

## 2017-03-15 PROCEDURE — 93017 CV STRESS TEST TRACING ONLY: CPT

## 2017-03-15 PROCEDURE — 97110 THERAPEUTIC EXERCISES: CPT

## 2017-03-15 PROCEDURE — 80048 BASIC METABOLIC PNL TOTAL CA: CPT | Performed by: HOSPITALIST

## 2017-03-15 PROCEDURE — 97116 GAIT TRAINING THERAPY: CPT

## 2017-03-15 PROCEDURE — 93018 CV STRESS TEST I&R ONLY: CPT | Performed by: INTERNAL MEDICINE

## 2017-03-15 NOTE — PROGRESS NOTES
[facility] Hospitalist Progress Note                                                                   [address] [place] [address],Third Floor  4/22/1929    CC: F/U UTI, A fib with RVR    SUBJECTIVE:    Late entry, pt se • Heparin infusion 600 Units/hr (03/14/17 1937)     PRN: acetaminophen, Heparin infusion    Assessment/Plan:  Principal Problem:    Weakness generalized  Active Problems:    Hyponatremia    Hyperglycemia    Anemia    Azotemia    Urinary tract infection,

## 2017-03-15 NOTE — PLAN OF CARE
Assumed patient care at 0. Patient A&O x 4 with some forgetfulness noted at times. No complaints of pain or discomfort overnight. VSS. Tele-NSR. Patient receiving Rocephin for + UTI. Patient has been more drowsy today and also seems to be more weak.  Pot

## 2017-03-15 NOTE — PROGRESS NOTES
[facility] Hospitalist Progress Note                                                                   [address] [place] [address],Third Floor  4/22/1929    CC: F/U UTI, A fib with RVR    SUBJECTIVE:    Pt states coughin Units/hr (03/15/17 7132)     PRN: acetaminophen, Heparin infusion    Assessment/Plan:  Principal Problem:    Weakness generalized  Active Problems:    Hyponatremia    Hyperglycemia    Anemia    Azotemia    Urinary tract infection, site unspecified      87

## 2017-03-15 NOTE — PROGRESS NOTES
Preliminary Lexiscan stress test    No EKG changes noted. Pt denied cardiac symptoms. No ectopy/arrhythmia noted. Nuclear pending.

## 2017-03-15 NOTE — PROGRESS NOTES
[facility] Cardiology  Progress Note    Jennifer Rich Patient Status:  Inpatient    1929 MRN [id number]   [facility] 3NE-A Attending Jaymie Gonzalez MD   Hosp Day # 4 PCP Rosemary Meyers MD     Subjective:   Feels a acute distress. HEENT:  Normocephalic. Atraumatic. No icterus. Neck:  There is no jugular venous distention. Cardiovascular:  Cardiovascular examination demonstrates a regular rate and rhythm. There is normal S1, S2. There is no S3 or S4.   There ar Wall thickness was normal.     Systolic function was normal. The estimated ejection fraction was 55-60%.      There was no diagnostic evidence for regional wall motion abnormalities.     Doppler parameters are consistent with abnormal left ventricular     r

## 2017-03-15 NOTE — PHYSICAL THERAPY NOTE
PHYSICAL THERAPY TREATMENT NOTE - INPATIENT    Room Number: 9149/8440-T     Session: 2   Number of Visits to Meet Established Goals: 5    Presenting Problem: generalized weakness    Problem List  Principal Problem:    Weakness generalized  Active Problems Poor +  Dynamic Standing: Poor +    ACTIVITY TOLERANCE    AM-PAC '6-Clicks' INPATIENT SHORT FORM - BASIC MOBILITY  How much difficulty does the patient currently have. ..  -   Turning over in bed (including adjusting bedclothes, sheets and blankets)?: A Lit complete gait outside of room. This is a decline from last session w/ therapy.     THERAPEUTIC EXERCISES  Lower Extremity Alternating marching  Ankle pumps  Hip adduction squeezes  LAQ     Upper Extremity      Position Sitting     Repetitions   10   Sets

## 2017-03-16 VITALS
WEIGHT: 133.5 LBS | RESPIRATION RATE: 18 BRPM | BODY MASS INDEX: 23.65 KG/M2 | DIASTOLIC BLOOD PRESSURE: 74 MMHG | OXYGEN SATURATION: 99 % | HEIGHT: 63 IN | SYSTOLIC BLOOD PRESSURE: 118 MMHG | HEART RATE: 57 BPM | TEMPERATURE: 97 F

## 2017-03-16 LAB
APTT PPP: 61.9 SECONDS (ref 25–34)
APTT PPP: 82.5 SECONDS (ref 25–34)

## 2017-03-16 PROCEDURE — 85730 THROMBOPLASTIN TIME PARTIAL: CPT | Performed by: HOSPITALIST

## 2017-03-16 PROCEDURE — 85730 THROMBOPLASTIN TIME PARTIAL: CPT | Performed by: INTERNAL MEDICINE

## 2017-03-16 RX ORDER — AMIODARONE HYDROCHLORIDE 200 MG/1
200 TABLET ORAL 2 TIMES DAILY
Qty: 60 TABLET | Refills: 0 | Status: SHIPPED | OUTPATIENT
Start: 2017-03-16 | End: 2017-03-16

## 2017-03-16 RX ORDER — AMIODARONE HYDROCHLORIDE 200 MG/1
200 TABLET ORAL 2 TIMES DAILY WITH MEALS
Status: DISCONTINUED | OUTPATIENT
Start: 2017-03-17 | End: 2017-03-16

## 2017-03-16 RX ORDER — AMIODARONE HYDROCHLORIDE 200 MG/1
200 TABLET ORAL 2 TIMES DAILY
Qty: 60 TABLET | Refills: 0 | Status: ON HOLD | OUTPATIENT
Start: 2017-03-16 | End: 2017-03-26

## 2017-03-16 NOTE — PROGRESS NOTES
NURSING DISCHARGE NOTE    Discharged Other, (see nursing note) via Wheelchair. Accompanied by Family member  Belongings Taken by patient/family. Patient discharging to [facility] assisted living with [facility].   Discussed discharge instructions with patient,

## 2017-03-16 NOTE — DISCHARGE SUMMARY
General Medicine Discharge Summary     Patient ID:  Ebony Vu  80year old  4/22/1929    Admit date: 3/11/2017    Discharge date and time: 3/16/2017    Attending Physician: Viviana Blanco MD     [facility] wnl    UTI  -Patient with suprapubic tenderness on exam, urine culture with E. Coli, resistant to bactrim  - pt completed 5 day course of ceftriaxone    HTN  -started on beta blockade as above    Anemia  -11.8 on admit  -No signs/symptoms of bleeding  -CTM normal limits. Diffuse increased interstitial opacities without focal consolidation, pleural effusion, or pneumothorax. Left apical pleural calcification. Atherosclerotic aortic arch.  Osseous structures demonstrate extensive degenerative change involving t time coordinating care for discharge: Greater than 30 minutes    Conrado Roach MD  [facility] Hospitalist

## 2017-03-16 NOTE — PROGRESS NOTES
Received patient at 0730  A/O x 3-4  SB on tele  Heparin gtt rate at 5ml/hr  Per , [place] not covered by insurance  Cardiology to decide on anticoagulation. Will continue to monitor.

## 2017-03-16 NOTE — PROGRESS NOTES
[facility] Cardiology  Progress Note    Bita Dollar Patient Status:  Inpatient    1929 MRN [id number]   [facility] 3NE-A Attending Nataly Fry MD   Hosp Day # 5 PCP Nata De La Rosa MD     Subjective:   Feels b Atraumatic. No icterus. Neck:  There is no jugular venous distention. Cardiovascular:  Cardiovascular examination demonstrates a regular rate and rhythm. There is normal S1, S2. There is no S3 or S4. There are no murmurs, rubs, or gallops.   No click diastolic dysfunction. 2. Right ventricle: The cavity size was normal. Wall thickness was normal.     Systolic function was normal. Systolic pressure was mildly increased. RV     systolic pressure (S, est): 32mm Hg.   3. Left atrium: The left atrial volume

## 2017-03-16 NOTE — CM/SW NOTE
Spoke with pt regarding pharmacy. Pt was not sure. Attempt made to call son Paloma Lei (from face sheet) invalid phone number. Pharmacy information obtained from primary RN. RX for Eliquis 2.5mg PO BID call into [facility] in [place] [phone]. Denied.

## 2017-03-16 NOTE — CM/SW NOTE
ALESHA informed of pending discharge for today. ECIN updates sent to [facility] ([phone]). ADDENDUM:  ALESHA spoke with Norberto Mcmahon at [address] ([phone]) informing of pending d/c for today and confirmed patient can return.  Zaina informed [facility] is n

## 2017-03-17 NOTE — CM/SW NOTE
Pt d/c 03/16 returning to [facility] NN with [facility] [address]       03/17/17 1400   Discharge disposition   Discharged to: Home or Self   Name of [address]   Additional Home Care/Hospice Provider ([address] [place] [address])   Home services

## 2017-03-23 ENCOUNTER — APPOINTMENT (OUTPATIENT)
Dept: GENERAL RADIOLOGY | Facility: HOSPITAL | Age: 82
DRG: 194 | End: 2017-03-23
Attending: EMERGENCY MEDICINE
Payer: MEDICARE

## 2017-03-23 ENCOUNTER — HOSPITAL ENCOUNTER (INPATIENT)
Facility: HOSPITAL | Age: 82
LOS: 1 days | Discharge: NURSING, REHAB OR LONG-TERM CARE | DRG: 194 | End: 2017-03-26
Attending: EMERGENCY MEDICINE | Admitting: INTERNAL MEDICINE
Payer: MEDICARE

## 2017-03-23 DIAGNOSIS — R53.83 OTHER FATIGUE: ICD-10-CM

## 2017-03-23 DIAGNOSIS — R00.1 SINUS BRADYCARDIA: Primary | ICD-10-CM

## 2017-03-23 PROBLEM — Z91.81 AT RISK FOR FALLING: Status: ACTIVE | Noted: 2017-03-23

## 2017-03-23 PROCEDURE — 99220 INITIAL OBSERVATION CARE,LEVL III: CPT | Performed by: INTERNAL MEDICINE

## 2017-03-23 PROCEDURE — 71010 XR CHEST AP PORTABLE  (CPT=71010): CPT

## 2017-03-23 RX ORDER — ACETAMINOPHEN 325 MG/1
650 TABLET ORAL EVERY 6 HOURS PRN
Status: DISCONTINUED | OUTPATIENT
Start: 2017-03-23 | End: 2017-03-26

## 2017-03-23 RX ORDER — MAGNESIUM OXIDE 400 MG (241.3 MG MAGNESIUM) TABLET
2 TABLET NIGHTLY
Status: DISCONTINUED | OUTPATIENT
Start: 2017-03-23 | End: 2017-03-26

## 2017-03-23 RX ORDER — BISACODYL 10 MG
10 SUPPOSITORY, RECTAL RECTAL
Status: DISCONTINUED | OUTPATIENT
Start: 2017-03-23 | End: 2017-03-26

## 2017-03-23 RX ORDER — AMIODARONE HYDROCHLORIDE 200 MG/1
200 TABLET ORAL DAILY
Status: DISCONTINUED | OUTPATIENT
Start: 2017-03-24 | End: 2017-03-24

## 2017-03-23 RX ORDER — SODIUM PHOSPHATE, DIBASIC AND SODIUM PHOSPHATE, MONOBASIC 7; 19 G/133ML; G/133ML
1 ENEMA RECTAL ONCE AS NEEDED
Status: ACTIVE | OUTPATIENT
Start: 2017-03-23 | End: 2017-03-23

## 2017-03-23 RX ORDER — METOPROLOL SUCCINATE 25 MG/1
25 TABLET, EXTENDED RELEASE ORAL
Status: DISCONTINUED | OUTPATIENT
Start: 2017-03-24 | End: 2017-03-24

## 2017-03-23 RX ORDER — ONDANSETRON 2 MG/ML
4 INJECTION INTRAMUSCULAR; INTRAVENOUS EVERY 6 HOURS PRN
Status: DISCONTINUED | OUTPATIENT
Start: 2017-03-23 | End: 2017-03-26

## 2017-03-23 RX ORDER — SODIUM CHLORIDE 9 MG/ML
INJECTION, SOLUTION INTRAVENOUS CONTINUOUS
Status: DISCONTINUED | OUTPATIENT
Start: 2017-03-23 | End: 2017-03-25

## 2017-03-23 RX ORDER — OSELTAMIVIR PHOSPHATE 75 MG/1
75 CAPSULE ORAL 2 TIMES DAILY
Status: DISCONTINUED | OUTPATIENT
Start: 2017-03-23 | End: 2017-03-26

## 2017-03-23 RX ORDER — ASPIRIN 81 MG/1
81 TABLET ORAL
Status: DISCONTINUED | OUTPATIENT
Start: 2017-03-23 | End: 2017-03-26

## 2017-03-23 RX ORDER — POLYETHYLENE GLYCOL 3350 17 G/17G
17 POWDER, FOR SOLUTION ORAL DAILY PRN
Status: DISCONTINUED | OUTPATIENT
Start: 2017-03-23 | End: 2017-03-26

## 2017-03-23 NOTE — ED NOTES
Patient resting on cart comfortably with son at bedside. Reports feeling well, denies any complaints. VSS. Urine specimen collected and sent to lab. Remains updated with plan of care.

## 2017-03-23 NOTE — H&P
[facility] HOSPITALIST  History and Physical     Lisette Guerrier Patient Status:  Emergency    1929 MRN [id number]   Location [address] [facility] Attending Janet Flemnig MD   Hosp Day # 0 PCP Rosemary Meyers MD     Chief Co reports that she does not drink alcohol or use illicit drugs.     Family History:   Family History   Problem Relation Age of Onset   • duodenal carcinoma[other] [OTHER] Mother    • Cancer Mother      stomach   • melanoma[other] [OTHER] Brother      X2   • C Pertinent positives and negatives noted in the HPI. Physical Exam:    /93 mmHg  Pulse 46  Temp(Src) 97 °F (36.1 °C) (Temporal)  Resp 16  Ht 5' 3\" (1.6 m)  Wt 130 lb (58.968 kg)  BMI 23.03 kg/m2  SpO2 99%  General: No acute distress.  Alert and o Prophylaxis: xarelto  · CODE status: full  · Borges: no    Plan of care discussed with pt and er    Eduarda Stinson MD  3/23/2017

## 2017-03-23 NOTE — ED PROVIDER NOTES
Patient Seen in: [facility] Emergency Department    History   Patient presents with:  Fatigue (constitutional, neurologic)    Stated Complaint: weakness    HPI    Patient is a 70-year-old female who was recently discharged from the hospital last wee MG Oral Tab,  Take 1 tablet (20 mg total) by mouth daily with food. metoprolol Tartrate 25 MG Oral Tab,  Take 1 tablet (25 mg total) by mouth 2x Daily(Beta Blocker). melatonin 1 MG Oral Tab,  Take 2 mg by mouth nightly.    DiphenhydrAMINE HCl 50 MG Oral 100%        Physical Exam    General: Alert and oriented in no distress. Neuro: CN II-XII intact. Grossly normal and symmetric motor strength and sensation proximally and distally throughout all 4 extremities. HEENT: No lymphadenopathy.  Mucus membranes range has been validated against 0.3-0.7 heparin anti-Xa units/mL.      CBC W/ DIFFERENTIAL - Abnormal; Notable for the following:     Monocyte Absolute 0.75 (*)     All other components within normal limits   TROPONIN I - Normal   CBC WITH DIFFERENTIAL WIT stable. Increasing interstitial opacities without new focal consolidation, pleural effusion, or pneumothorax. Bibasilar dependent changes. Hyperinflation of lungs consistent with COPD. Stable osseous structures. 3/13/2017  CONCLUSION:  1.  Increasing PATIENT STATED HISTORY: (As transcribed by Technologist)  The patient is feeling fatigue and weakness. 3/23/2017  CONCLUSION:      Borderline cardiac enlargement. Tortuosity descending aorta.  No acute CHF, acute pneumonia, sizable effusion, pneumoth

## 2017-03-23 NOTE — PLAN OF CARE
Admission navigator complete  PTA meds reviewed w/ pt and son. Pt oriented to room and use of call light. Bed is in the locked, lowest position. All questions answered, all needs met.

## 2017-03-23 NOTE — ED INITIAL ASSESSMENT (HPI)
Patient reports she was here last Thursday for new onset a-fib, discharged with new meds. Reports developing increasing fatigue/generalized weakness since Monday.  Also reports being diagnosed with UTI while here in hospital. Denies dizziness/headache, sheeba

## 2017-03-23 NOTE — CONSULTS
[facility] [address] [place] [address] Cardiology  Report of Consultation    Maya Curiel Patient Status:  Observation    1929 MRN [id number]   [facility] 8NE-A Attending Darline Cazares MD   Hosp Day # 0 PCP Urbano Santiago MD     Reason for C REPLACEMENT SURGERY Bilateral     Comment bilateral hips      Family History   Problem Relation Age of Onset   • duodenal carcinoma[other] [OTHER] Mother    • Cancer Mother      stomach   • melanoma[other] [OTHER] Brother      X2   • Cancer Brother      me FISH OIL 1 DAILY Disp:  Rfl:        Allergies:     Darvocet [Propoxyph*    Diarrhea    Comment:Reported by pt who wants this on her record. Was treated with this post oral surgery.   Fosamax                     Comment:Does not remember reacti ALKPHO  87   AST  20   ALT  34   BILT  0.4   TP  6.4       Recent Labs   Lab  03/23/17   1046   RBC  4.29   HGB  12.4   HCT  37.6   MCV  87.6   MCH  28.9   MCHC  33.0   RDW  13.6   NEPRELIM  5.52   WBC  8.2   PLT  268.0       Recent Labs   Lab  03/23/17

## 2017-03-24 PROCEDURE — 99225 SUBSEQUENT OBSERVATION CARE: CPT | Performed by: INTERNAL MEDICINE

## 2017-03-24 RX ORDER — OSELTAMIVIR PHOSPHATE 75 MG/1
75 CAPSULE ORAL 2 TIMES DAILY
Qty: 8 CAPSULE | Refills: 0 | Status: SHIPPED | OUTPATIENT
Start: 2017-03-24 | End: 2017-03-28

## 2017-03-24 NOTE — PROGRESS NOTES
[facility]ITAL[facility]  Progress Note     [facility] Patient Status:  Observation    1929 MRN [id number]   [facility] 8NE-A Attending Patrice Linn MD   Hosp Day # 1 PCP Giovanni Nevarez MD     Chief Complaint: fatigue    S: Pa Daily       ASSESSMENT / PLAN:     1. Fatigue - influenza positive and medications                  cont tamiflu   Decreasing metoprolol and amio per cards                  TSH normal  2.  Bradycardia -hold beta-blocker.  Appreciate cardiology consultation

## 2017-03-24 NOTE — DISCHARGE SUMMARY
[facility] HOSPITALIST  DISCHARGE SUMMARY     Candice Esteves Patient Status:  Observation    1929 MRN [id number]   [facility] 8NE-A Attending Kiana Fernandez MD   [facility] Day # 3 PCP Hope Caraballo MD     Date of Admission: 3/23/2017 PT evaluated the patient and recommended short acute rehab stay to regain strength after this acute phase.     Procedures during hospitalization:   • None    Incidental or significant findings and recommendations (brief descriptions):  • Sinus bradycardia n Commonly known as:  PACERONE           metoprolol Tartrate 25 MG Tabs   Commonly known as:  LOPRESSOR           Zolpidem Tartrate 5 MG Tabs   Commonly known as:  AMBIEN                Where to Get Your Medications      Please  your prescriptions a

## 2017-03-24 NOTE — PROGRESS NOTES
[facility] Cardiology  Progress Note    Candice Corleygen Patient Status:  Observation    1929 MRN [id number]   [facility] 8NE-A Attending Kiana Fernandez MD   Hosp Day # 1 PCP Hope Caraballo MD     Subjective:   No ches icterus. Neck:  There is no jugular venous distention. Cardiovascular:  Cardiovascular examination demonstrates a regular bradycardic rate and rhythm. There is normal S1, S2. There is no S3 or S4. There are no murmurs, rubs, or gallops.   No click is was normal.     Systolic function was normal. Systolic pressure was mildly increased. RV     systolic pressure (S, est): 32mm Hg. 3. Left atrium: The left atrial volume was mildly increased. 4. Mitral valve: Mildly calcified annulus.  There was mild regur

## 2017-03-24 NOTE — PHYSICAL THERAPY NOTE
PHYSICAL THERAPY EVALUATION - INPATIENT     Room Number: 0308/7297-J  Evaluation Date: 3/24/2017  Type of Evaluation: Initial  Physician Order: PT Eval and Treat    Presenting Problem: atr fib with rrvr, influenza  Reason for Therapy: Mobility Dysfunct apt)  Drives: No  Patient Owned Equipment: Rolling walker  Patient Regularly Uses: Reading glasses; Hearing aides    Prior Level of Cas[place]: pt is pleasantly confused, but reports she is alone in her apt and was ind with amb with rw, amb to dining room (including adjusting bedclothes, sheets and blankets)?: A Little   -   Sitting down on and standing up from a chair with arms (e.g., wheelchair, bedside commode, etc.): A Lot   -   Moving from lying on back to sitting on the side of the bed?: A Lot   How m admitted 3/23/2017 for weakness, cough, influenza B diagnosis, also with atr fib with rvr. In this PT evaluation, the patient presents with the following impairments: dec strength, balance, activity tolerance, fear of falling.   These impairments manifest

## 2017-03-24 NOTE — PLAN OF CARE
Problem: CARDIOVASCULAR - ADULT  Goal: Maintains optimal cardiac output and hemodynamic stability  INTERVENTIONS:  - Monitor vital signs, rhythm, and trends  - Monitor for bleeding, hypotension and signs of decreased cardiac output  - Evaluate effectivenes mobility/gait  Interventions:  - Assess patient’s functional ability and stability  - Promote increasing activity/tolerance for mobility and gait  - Educate and engage patient/family in tolerated activity level and precautions  Outcome: Progressing

## 2017-03-25 PROCEDURE — 99232 SBSQ HOSP IP/OBS MODERATE 35: CPT | Performed by: INTERNAL MEDICINE

## 2017-03-25 NOTE — PLAN OF CARE
Called by RN. HR dropped into 30s while napping. Awakened pt and HR came up to  50s. This was despite decrease in meetop from 25 mg bid to 12.5 mg bid and d/c amio. I will d/c metoprolol and pt cannot tx to [facility] today.

## 2017-03-25 NOTE — CM/SW NOTE
03/25/17 0900   CM/SW Referral Data   Referral Source Physician   Reason for Referral Discharge planning;Readmission   Informant Spouse; Children   Readmission Assessment   Factors that patient feels contributed to this readmission Other (only choose if agreeable to [facility], list emailed for son to review.  DON screen requested    sw to follow up for further dc planning    [address]

## 2017-03-25 NOTE — PROGRESS NOTES
[facility]  Progress Note     Maria Del Carmen Murray Patient Status:  Observation    1929 MRN [id number]   [facility] 8NE-A Attending Ricky Duvall MD   Hosp Day # 2 PCP Tolu De La Fuente MD     Chief Complaint: fatigue    S: Pa medications                  cont tamiflu   Decreased metoprolol and dc amio per cards                  Appreciate cards recs  2. Bradycardia -decreased beta-blocker.  Appreciate cardiology consultation  3.  A. fib -monitor off antiarrhythmic medications co

## 2017-03-25 NOTE — CM/SW NOTE
Son and pt have discussed [facility] and they would like referral to [facility] their first choice and [facility] as their second choice. [facility] referrals made and are pending.  sw to follow

## 2017-03-25 NOTE — PROGRESS NOTES
[facility] [address] [place] [address] Cardiology  Progress Note    Duane Hailstone Patient Status:  Observation    1929 MRN [id number]   [facility] 8NE-A Attending Cristiano Pinzon MD   Hosp Day # 2 PCP Mary Vieyra MD     Subjective:   No ches distention. Cardiovascular:  Cardiovascular examination demonstrates a regular bradycardic rate and rhythm. There is normal S1, S2. There is no S3 or S4. There are no murmurs, rubs, or gallops. No click is appreciated.   PMI is nondisplaced with a nor normal. Systolic pressure was mildly increased. RV     systolic pressure (S, est): 32mm Hg. 3. Left atrium: The left atrial volume was mildly increased. 4. Mitral valve: Mildly calcified annulus. There was mild regurgitation. 5. Tricuspid valve:  There w

## 2017-03-26 VITALS
SYSTOLIC BLOOD PRESSURE: 118 MMHG | HEIGHT: 63 IN | TEMPERATURE: 98 F | WEIGHT: 123.44 LBS | HEART RATE: 58 BPM | DIASTOLIC BLOOD PRESSURE: 68 MMHG | RESPIRATION RATE: 18 BRPM | OXYGEN SATURATION: 100 % | BODY MASS INDEX: 21.87 KG/M2

## 2017-03-26 PROCEDURE — 99239 HOSP IP/OBS DSCHRG MGMT >30: CPT | Performed by: INTERNAL MEDICINE

## 2017-03-26 NOTE — PLAN OF CARE
Pt admit w/ Influenza, bradycardia while here- medications adjusted. Pt A&O, denies pain at present. VSS.  NSR/ SB on tele- pt on xarelto. Pt cleared for discharge today- pt does not want to go back to rehab, wants to go back to [facility].  Will address w

## 2017-03-26 NOTE — CM/SW NOTE
03/26/17 1400   Discharge disposition   Discharged to: Skilled Nurs   Name of Facillity/Home Care/Hospice [facility]   Discharge transportation Private car  ([id number])     [facility]   [phone]

## 2017-03-26 NOTE — PROGRESS NOTES
NURSING DISCHARGE NOTE      Pt discharged to [facility] in stable condition. Discharge instructions provided and reviewed w/ pt. Prescriptions, medication list, and follow-up appointments provided and reviewed w/ pt.    Pt stated understanding of a

## 2017-03-26 NOTE — PROGRESS NOTES
[facility] [address] [place] [address] Cardiology  Progress Note    [facility]:  [facility]    1929 MRN [id number]   [facility] 8NE-A Attending Gadiel Florentino MD   Hosp Day # 3 PCP Elsi Hernandez MD     Subjective:   No ches demonstrates a regular bradycardic rate and rhythm. There is normal S1, S2. There is no S3 or S4. There are no murmurs, rubs, or gallops. No click is appreciated. PMI is nondisplaced with a normal apical impulse.     Pulmonary:  Lungs are clear to ausc pressure (S, est): 32mm Hg. 3. Left atrium: The left atrial volume was mildly increased. 4. Mitral valve: Mildly calcified annulus. There was mild regurgitation. 5. Tricuspid valve: There was mild regurgitation.     Stress Test:     IMPRESSION:    1.  No

## 2017-03-26 NOTE — CM/SW NOTE
Final dc orders received. sw confirmed bed at [facility]. Son to provide transportation. Dc time arranged for 3pm. RN to call report to [phone].      Plan: pt to dc today at 3pm via family car to [address]

## 2017-03-26 NOTE — PROGRESS NOTES
[facility]  Progress Note     Malena Askew Patient Status:  Observation    1929 MRN [id number]   [facility] 8NE-A Attending Denver Hunt MD   Hosp Day # 3 PCP Parker Posey MD     Chief Complaint: fatigue    S: Pa positive and medications                  cont tamiflu   DC metoprolol and dc amio per cards                  Appreciate cards recs  2. Bradycardia -persistent after holding [address] cardiology consultation  3.  A. fib -monitor off antiarrhy

## 2017-03-27 ENCOUNTER — SNF ADMIT/H&P (OUTPATIENT)
Dept: INTERNAL MEDICINE CLINIC | Age: 82
End: 2017-03-27

## 2017-03-27 DIAGNOSIS — M81.0 OSTEOPOROSIS: Primary | ICD-10-CM

## 2017-03-27 DIAGNOSIS — I48.0 PAROXYSMAL ATRIAL FIBRILLATION (HCC): ICD-10-CM

## 2017-03-27 NOTE — PROGRESS NOTES
Yvan Braga  : 1929  Age 80year old  female patient is admitted to [facility] for [address]. She'd developed AF, was admitted earlier this mo. and started on amiodarone and metoprolol.  She developed significant fatigue with bradycardia requiring r Past Surgical History:    HYSTERECTOMY                                                     Comment:11/06 vag hysterectomy with sling    TOTAL HIP REPLACEMENT                                            Comment: mouth nightly as needed for Sleep., Disp: , Rfl:   Denosumab (PROLIA SC), Inject into the skin., Disp: , Rfl:   Probiotic Product (ALIGN OR), Take  by mouth., Disp: , Rfl:   LONGS ADULT LOW STRENGTH ASA 81 MG OR TBEC, 1 TABLET DAILY, Disp: , Rfl:   FISH OI masses; no bruits; nontender, no guarding, no rebound tenderness. :Deferred  LYMPHATIC:no lymphedema  MUSCULOSKELETAL: no acute synovitis upper or lower extremity  EXTREMITIES/VASCULAR:no cyanosis, clubbing .  RLE TR EDEMA  NEUROLOGIC: intact; no sensori

## 2017-03-28 ENCOUNTER — SNF VISIT (OUTPATIENT)
Dept: INTERNAL MEDICINE CLINIC | Age: 82
End: 2017-03-28

## 2017-03-28 VITALS
HEART RATE: 82 BPM | TEMPERATURE: 97 F | RESPIRATION RATE: 20 BRPM | SYSTOLIC BLOOD PRESSURE: 116 MMHG | DIASTOLIC BLOOD PRESSURE: 73 MMHG | OXYGEN SATURATION: 99 %

## 2017-03-28 DIAGNOSIS — I48.0 PAROXYSMAL ATRIAL FIBRILLATION (HCC): ICD-10-CM

## 2017-03-28 DIAGNOSIS — A49.2 H. INFLUENZAE INFECTION: Primary | ICD-10-CM

## 2017-03-28 DIAGNOSIS — M81.0 OSTEOPOROSIS: ICD-10-CM

## 2017-03-28 PROCEDURE — 99309 SBSQ NF CARE MODERATE MDM 30: CPT | Performed by: NURSE PRACTITIONER

## 2017-03-28 NOTE — PROGRESS NOTES
Shefali Stoddard  : 1929  Age 80year old  female patient is admitted to Facility: [facility] for [address] [place] [address] date:  3/23/17  Discharge date to [facility]:  3/27/17  ELOS:  13-15 day  Anticipated discharge date:  17    Patient pr spine ([facility] [address]      T12 and L2 comp fx. 12/09 (? age)   • Arthritis    • Essential hypertension    • Endocrine disorder    • Osteoarthritis    • Arrhythmia          Past Surgical History    HYSTERECTOMY      Comment 11/06 vag hysterectomy with sling    TOTAL by mouth daily.  Disp:  Rfl:        VITALS:  /73 mmHg  Pulse 82  Temp(Src) 97.4 °F (36.3 °C) (Oral)  Resp 20  SpO2 99%     REVIEW OF SYSTEMS:  GENERAL HEALTH:feels well otherwise  SKIN: denies any unusual skin lesions or rashes  WOUNDS: none to review nondistended; no organomegaly, no masses; no bruits; nontender, no guarding, no rebound tenderness.   :no suprapubic distension and no CVA tenderness  LYMPHATIC:no lymphedema  MUSCULOSKELETAL: no acute synovitis upper or lower extremity  EXTREMITIES/VASCU shift  2. xarelto 15mg po daily  3. Cbc/cmp at next draw  4. amio and metoprolol dcd at hospital  5. F/u with cardiology as recommended    Insomnia  1. Melatonin 2mg  Nightly  2. Benadryl prn for sleep  3.  Zolpidem dcd at [place] [address]

## 2017-03-30 ENCOUNTER — SNF VISIT (OUTPATIENT)
Dept: INTERNAL MEDICINE CLINIC | Age: 82
End: 2017-03-30

## 2017-03-30 VITALS
OXYGEN SATURATION: 95 % | HEART RATE: 80 BPM | DIASTOLIC BLOOD PRESSURE: 60 MMHG | BODY MASS INDEX: 21 KG/M2 | RESPIRATION RATE: 20 BRPM | WEIGHT: 118.19 LBS | SYSTOLIC BLOOD PRESSURE: 102 MMHG | TEMPERATURE: 99 F

## 2017-03-30 DIAGNOSIS — R53.1 WEAKNESS: Primary | ICD-10-CM

## 2017-03-30 PROCEDURE — 99308 SBSQ NF CARE LOW MDM 20: CPT | Performed by: NURSE PRACTITIONER

## 2017-03-30 NOTE — PROGRESS NOTES
Sho Medellin, 4/22/1929, 80year old, female    Chief Complaint:  Patient presents with:  Weakness: I am off isolation now/no fever/no flu symptoms/I want to go home       Subjective:    Patient seen in f/u to her admit to [facility]/sp/ admit ot elio and dorsalis pedal pulses 2+  NEUROLOGIC: intact; no sensorimotor deficit, cranial nerves intact II-XII, follows commands  PSYCHIATRIC: alert and oriented x 3; affect appropriate      Medications reviewed: Yes    Diagnostics reviewed:            White Blood Alkaline Phos  82 IU/L   Final           Total Bilirubin  0.4 mg/dL 0.0-1.0  Final      Medical decision making:    Patient doing well/completed flu tx/no fevers/no cough/is off isolation/reviewed cbc/cmp above:WNL/no new orders for labs/patient want

## 2017-04-03 ENCOUNTER — SNF VISIT (OUTPATIENT)
Dept: INTERNAL MEDICINE CLINIC | Age: 82
End: 2017-04-03

## 2017-04-03 DIAGNOSIS — R53.1 WEAKNESS GENERALIZED: ICD-10-CM

## 2017-04-03 DIAGNOSIS — M79.675 TOE PAIN, LEFT: Primary | ICD-10-CM

## 2017-04-03 NOTE — CDS QUERY
Clarification – Significance of Diagnostic Report/Results  Jemma Estrada  Dear Doctor:  Clinical information (provided below) suggests a diagnosis in a diagnostic report and/or results.  For accurate ICD-10-CM code assignment to

## 2017-04-04 ENCOUNTER — SNF VISIT (OUTPATIENT)
Dept: INTERNAL MEDICINE CLINIC | Age: 82
End: 2017-04-04

## 2017-04-04 VITALS
HEART RATE: 70 BPM | WEIGHT: 127.19 LBS | SYSTOLIC BLOOD PRESSURE: 125 MMHG | RESPIRATION RATE: 18 BRPM | OXYGEN SATURATION: 98 % | DIASTOLIC BLOOD PRESSURE: 78 MMHG | BODY MASS INDEX: 23 KG/M2 | TEMPERATURE: 98 F

## 2017-04-04 DIAGNOSIS — S99.922A INJURY OF TOENAIL, LEFT, INITIAL ENCOUNTER: Primary | ICD-10-CM

## 2017-04-04 DIAGNOSIS — R53.81 PHYSICAL DECONDITIONING: ICD-10-CM

## 2017-04-04 PROCEDURE — 99309 SBSQ NF CARE MODERATE MDM 30: CPT | Performed by: NURSE PRACTITIONER

## 2017-04-04 RX ORDER — ACETAMINOPHEN 325 MG/1
325 TABLET ORAL EVERY 6 HOURS PRN
COMMUNITY
End: 2017-08-02

## 2017-04-04 NOTE — PROGRESS NOTES
Bety Duarte, 4/22/1929, 80year old, female s/p hospitalization at [facility].   Here @ [facility] [employer] Complaint:  Patient presents with:  Trauma (cardiovascular, musculoskeletal): left great toe trauma/injury  Weakness     Pt seen in follow-up s/p nipple discharge, no nodes; normal skin  RESPIRATORY:clear to percussion and auscultation  CARDIOVASCULAR: S1, S2 normal, RRR; no S3, no S4; , no click, no murmur: irregular rhythm today  ABDOMEN: normal active BS+, soft, nondistended; no organomegaly, no management PRN  3. Monitor for purulent drainage/foul odor/may need antibiotic    Physical Deconditioning-Well-Managed  1. PT/OT @ [facility]  2. Work toward D/C back to [employer]  1. prolia 60mg every 6 months  2.  Ca+/vit d supplement    Afib wi

## 2017-04-10 ENCOUNTER — SNF VISIT (OUTPATIENT)
Dept: INTERNAL MEDICINE CLINIC | Age: 82
End: 2017-04-10

## 2017-04-10 DIAGNOSIS — R03.0 ELEVATED BLOOD PRESSURE READING WITHOUT DIAGNOSIS OF HYPERTENSION: Primary | ICD-10-CM

## 2017-04-10 DIAGNOSIS — M25.559 ARTHRALGIA OF HIP, UNSPECIFIED LATERALITY: ICD-10-CM

## 2017-05-01 ENCOUNTER — TELEPHONE (OUTPATIENT)
Dept: INTERNAL MEDICINE CLINIC | Age: 82
End: 2017-05-01

## 2017-05-01 RX ORDER — RIVAROXABAN 15 MG/1
TABLET, FILM COATED ORAL
Qty: 30 TABLET | Refills: 0 | Status: CANCELLED | OUTPATIENT
Start: 2017-05-01

## 2017-05-04 PROCEDURE — 87077 CULTURE AEROBIC IDENTIFY: CPT | Performed by: INTERNAL MEDICINE

## 2017-05-04 PROCEDURE — 87086 URINE CULTURE/COLONY COUNT: CPT | Performed by: INTERNAL MEDICINE

## 2017-06-10 ENCOUNTER — HOSPITAL ENCOUNTER (EMERGENCY)
Facility: HOSPITAL | Age: 82
Discharge: HOME | End: 2017-06-10
Attending: EMERGENCY MEDICINE
Payer: MEDICARE

## 2017-06-10 ENCOUNTER — APPOINTMENT (OUTPATIENT)
Dept: ULTRASOUND IMAGING | Facility: HOSPITAL | Age: 82
End: 2017-06-10
Attending: EMERGENCY MEDICINE
Payer: MEDICARE

## 2017-06-10 VITALS
BODY MASS INDEX: 23.39 KG/M2 | DIASTOLIC BLOOD PRESSURE: 73 MMHG | OXYGEN SATURATION: 97 % | SYSTOLIC BLOOD PRESSURE: 138 MMHG | RESPIRATION RATE: 16 BRPM | WEIGHT: 132 LBS | HEIGHT: 63 IN | HEART RATE: 78 BPM | TEMPERATURE: 98 F

## 2017-06-10 DIAGNOSIS — M71.21 BAKER CYST, RIGHT: Primary | ICD-10-CM

## 2017-06-10 DIAGNOSIS — M25.561 ACUTE PAIN OF RIGHT KNEE: ICD-10-CM

## 2017-06-10 PROCEDURE — 93971 EXTREMITY STUDY: CPT | Performed by: EMERGENCY MEDICINE

## 2017-06-10 PROCEDURE — 99284 EMERGENCY DEPT VISIT MOD MDM: CPT

## 2017-06-10 NOTE — ED PROVIDER NOTES
Patient Seen in: [facility] Emergency Department    History   Patient presents with:  Knee Pain    Stated Complaint: right knee pain    HPI    49-year-old female presents with 5 days of right knee pain.   She has a history of atrial fibrillation and tablet,  Take 1 tablet by mouth every 12 (twelve) hours. rivaroxaban 15 MG Oral Tab,  Take 1 tablet (15 mg total) by mouth daily with food.    acetaminophen 325 MG Oral Tab,  Take 325 mg by mouth every 6 (six) hours as needed for Pain (patient provided pe and clear   Heart: regular rate rhythm and no murmur   Abdomen: Soft and nontender. No abdominal masses. No peritoneal signs   Extremities: Mild edema to the right lower extremity. Positive Homans.   Tenderness on palpation of the popliteal fossa on the with right knee pain and swelling to the lower extremity. Will ultrasound to rule out DVT. Given that this pain feels better in the morning and is worsened with ambulation throughout the day this sounds more consistent with an arthritic etiology.     Ultr

## 2017-06-30 PROBLEM — M23.91 INTERNAL DERANGEMENT OF KNEE, RIGHT: Status: ACTIVE | Noted: 2017-06-30

## 2017-06-30 PROBLEM — M87.9 OSTEONECROSIS (HCC): Status: ACTIVE | Noted: 2017-06-30

## 2017-06-30 PROCEDURE — 36415 COLL VENOUS BLD VENIPUNCTURE: CPT | Performed by: PHYSICIAN ASSISTANT

## 2017-06-30 PROCEDURE — 83970 ASSAY OF PARATHORMONE: CPT | Performed by: PHYSICIAN ASSISTANT

## 2017-07-29 ENCOUNTER — APPOINTMENT (OUTPATIENT)
Dept: GENERAL RADIOLOGY | Facility: HOSPITAL | Age: 82
End: 2017-07-29
Attending: EMERGENCY MEDICINE
Payer: MEDICARE

## 2017-07-29 ENCOUNTER — HOSPITAL ENCOUNTER (EMERGENCY)
Facility: HOSPITAL | Age: 82
Discharge: HOME | End: 2017-07-29
Attending: EMERGENCY MEDICINE
Payer: MEDICARE

## 2017-07-29 VITALS
WEIGHT: 130 LBS | OXYGEN SATURATION: 99 % | RESPIRATION RATE: 16 BRPM | SYSTOLIC BLOOD PRESSURE: 118 MMHG | HEART RATE: 98 BPM | TEMPERATURE: 94 F | HEIGHT: 63 IN | DIASTOLIC BLOOD PRESSURE: 77 MMHG | BODY MASS INDEX: 23.04 KG/M2

## 2017-07-29 DIAGNOSIS — K59.00 CONSTIPATION, UNSPECIFIED CONSTIPATION TYPE: Primary | ICD-10-CM

## 2017-07-29 PROCEDURE — 99284 EMERGENCY DEPT VISIT MOD MDM: CPT

## 2017-07-29 PROCEDURE — 82272 OCCULT BLD FECES 1-3 TESTS: CPT

## 2017-07-29 PROCEDURE — 99285 EMERGENCY DEPT VISIT HI MDM: CPT

## 2017-07-29 PROCEDURE — 74000 XR ABDOMEN (KUB) (1 AP VIEW)  (CPT=74000): CPT | Performed by: EMERGENCY MEDICINE

## 2017-07-29 RX ORDER — SORBITOL SOLUTION 70 %
30 SOLUTION, ORAL MISCELLANEOUS
Qty: 473 ML | Refills: 0 | Status: SHIPPED | OUTPATIENT
Start: 2017-07-29 | End: 2017-09-06

## 2017-07-29 RX ORDER — MAGNESIUM CARB/ALUMINUM HYDROX 105-160MG
296 TABLET,CHEWABLE ORAL ONCE
Status: COMPLETED | OUTPATIENT
Start: 2017-07-29 | End: 2017-07-29

## 2017-07-29 NOTE — ED PROVIDER NOTES
Patient Seen in: [facility] Emergency Department    History   Patient presents with:  Constipation (gastrointestinal)    Stated Complaint: constipation    HPI    60-year-old woman who comes in with no bowel movement for 3 days.   She said she had lar melatonin 1 MG Oral Tab,  Take 2 mg by mouth nightly. Denosumab (PROLIA SC),  Inject into the skin. Probiotic Product (ALIGN OR),  Take  by mouth.    LONGS ADULT LOW STRENGTH ASA 81 MG OR TBEC,  1 TABLET DAILY   FISH OIL,  1 DAILY   CALCIUM + D OR,  Alphonso abdominal gas pattern. 2. No acute process is discretely noted. Dictated by: Alfredo Owens DO on 7/29/2017 at 21:44     Approved by:  Alfredo Owens DO            ============================================================  ED Course  -------------------

## 2017-07-29 NOTE — ED INITIAL ASSESSMENT (HPI)
Patient states she has not had a bowel movement in 3 days, she has hx of constipation. She took Miralax and colace this morning.  Denies any blood in stool or black stools

## 2017-07-29 NOTE — ED NOTES
Patient updated with plan of care. Pt up to bedside commode with writer assist. Pt states she would like to drink Mag Citrate while on commode. Pt given call light to call staff if needed. Pt verbalized understanding.

## 2017-07-29 NOTE — ED NOTES
Patient assisted back to bed. No BM yet. Updated on POC. Son, Shivani Gregory, updated in waiting room. He can be called when patient is ready to be discharged.     Shivani Gregory (son) [phone]

## 2017-07-29 NOTE — ED NOTES
Pt up to commode, only able to urinate. Dr. Giordano Manus aware. Pt assisted back to bed, repositioned and given warm blankets.

## 2017-07-30 NOTE — ED NOTES
Pt talk with MD about test results, pt calm and cooperative, pt will be sent back to [facility], her son, pt report [facility], pt ready for dc home back.

## 2017-09-05 PROCEDURE — 87088 URINE BACTERIA CULTURE: CPT | Performed by: INTERNAL MEDICINE

## 2017-09-05 PROCEDURE — 87086 URINE CULTURE/COLONY COUNT: CPT | Performed by: INTERNAL MEDICINE

## 2017-09-05 PROCEDURE — 87186 SC STD MICRODIL/AGAR DIL: CPT | Performed by: INTERNAL MEDICINE

## 2017-09-06 ENCOUNTER — APPOINTMENT (OUTPATIENT)
Dept: LAB | Facility: HOSPITAL | Age: 82
End: 2017-09-06
Payer: MEDICARE

## 2017-09-06 DIAGNOSIS — K59.00 CONSTIPATION: ICD-10-CM

## 2017-09-06 PROCEDURE — 93005 ELECTROCARDIOGRAM TRACING: CPT

## 2017-09-06 PROCEDURE — 93010 ELECTROCARDIOGRAM REPORT: CPT | Performed by: INTERNAL MEDICINE

## 2017-09-07 LAB
ATRIAL RATE: 76 BPM
P AXIS: 54 DEGREES
P-R INTERVAL: 144 MS
Q-T INTERVAL: 392 MS
QRS DURATION: 76 MS
QTC CALCULATION (BEZET): 441 MS
R AXIS: -20 DEGREES
T AXIS: 9 DEGREES
VENTRICULAR RATE: 76 BPM

## 2017-09-14 RX ORDER — AMIODARONE HYDROCHLORIDE 200 MG/1
200 TABLET ORAL DAILY
COMMUNITY
End: 2017-12-01 | Stop reason: ALTCHOICE

## 2017-09-14 RX ORDER — POLYETHYLENE GLYCOL 3350 17 G/17G
17 POWDER, FOR SOLUTION ORAL 2 TIMES DAILY PRN
COMMUNITY
End: 2018-01-10

## 2017-09-14 RX ORDER — SORBITOL SOLUTION 70 %
30 SOLUTION, ORAL MISCELLANEOUS
COMMUNITY
End: 2017-12-01 | Stop reason: ALTCHOICE

## 2017-09-14 RX ORDER — CHOLECALCIFEROL (VITAMIN D3) 125 MCG
250 CAPSULE ORAL DAILY
COMMUNITY
End: 2017-12-01 | Stop reason: ALTCHOICE

## 2017-09-14 RX ORDER — DOCUSATE SODIUM 100 MG/1
100 CAPSULE, LIQUID FILLED ORAL 3 TIMES DAILY
COMMUNITY
End: 2018-01-18

## 2017-09-15 ENCOUNTER — SURGERY (OUTPATIENT)
Age: 82
End: 2017-09-15

## 2017-09-15 ENCOUNTER — HOSPITAL ENCOUNTER (OUTPATIENT)
Facility: HOSPITAL | Age: 82
Setting detail: HOSPITAL OUTPATIENT SURGERY
Discharge: HOME | End: 2017-09-15
Attending: INTERNAL MEDICINE | Admitting: INTERNAL MEDICINE
Payer: MEDICARE

## 2017-09-15 VITALS
HEIGHT: 63 IN | SYSTOLIC BLOOD PRESSURE: 97 MMHG | OXYGEN SATURATION: 100 % | WEIGHT: 120 LBS | RESPIRATION RATE: 19 BRPM | TEMPERATURE: 98 F | HEART RATE: 71 BPM | BODY MASS INDEX: 21.26 KG/M2 | DIASTOLIC BLOOD PRESSURE: 50 MMHG

## 2017-09-15 DIAGNOSIS — K59.00 CONSTIPATION, UNSPECIFIED CONSTIPATION TYPE: ICD-10-CM

## 2017-09-15 PROCEDURE — 0DJD8ZZ INSPECTION OF LOWER INTESTINAL TRACT, VIA NATURAL OR ARTIFICIAL OPENING ENDOSCOPIC: ICD-10-PCS | Performed by: INTERNAL MEDICINE

## 2017-09-15 RX ORDER — MIDAZOLAM HYDROCHLORIDE 1 MG/ML
INJECTION INTRAMUSCULAR; INTRAVENOUS
Status: DISCONTINUED | OUTPATIENT
Start: 2017-09-15 | End: 2017-09-15

## 2017-09-15 RX ORDER — SODIUM CHLORIDE, SODIUM LACTATE, POTASSIUM CHLORIDE, CALCIUM CHLORIDE 600; 310; 30; 20 MG/100ML; MG/100ML; MG/100ML; MG/100ML
INJECTION, SOLUTION INTRAVENOUS CONTINUOUS
Status: DISCONTINUED | OUTPATIENT
Start: 2017-09-15 | End: 2017-09-15

## 2017-09-15 NOTE — H&P
Gastroenterology H and P  By Dr. Chavez Friday  CC: constipation    HPI:  Jennifer Babcock is a 80year old female. Consult requested by  No ref. provider found for evaluation of constipation.   The patient complains of  constipation for years - worse rec by mouth daily.  Disp:  Rfl:    PEG 3350-KCl-Na Bicarb-NaCl (TRILYTE) 420 g Oral Recon Soln Take as directed, split dose Disp: 1 Bottle Rfl: 0     Past Medical History:   Diagnosis Date   • Arrhythmia    • Arthritis    • Colon polyps     '00: ? path,   6/04 ST  LUNGS: denies shortness of breath with exertion  CARDIOVASCULAR: denies chest pain on exertion  GI: as above  : denies nocturia or changes in stream  MUSCULOSKELETAL: denies back pain  NEURO: denies headaches  PSYCHE: denies depression or anxiety  HE

## 2017-09-15 NOTE — OPERATIVE REPORT
PATIENT NAME: Ayanna Cazares  MRN: [id number]  DATE OF OPERATION: 9/15/2017  REFERRING PHYSICIAN: Dr. Gisela Oscar  Medications:  Versed 2 mg IVP              Fentanyl 37.5 mcg IVP  DATE OF LAST COLONOSCOPY:  2009  PREOPERATIVE DIAGNOSIS                Co as needed.     Mitesh Estrella MD, Gastroenterologist  Cc: Dr. Maryana Garcia

## 2017-09-15 NOTE — BRIEF OP NOTE
Pre-Operative Diagnosis: Constipation, unspecified constipation type [K59.00]     Post-Operative Diagnosis: diverticulosis     Procedure Performed:   Procedure(s):  COLONOSCOPY    Surgeon(s) and Role:     * Gianni Cheney MD - Primary    Assistant(s):

## 2017-10-30 PROCEDURE — 87086 URINE CULTURE/COLONY COUNT: CPT | Performed by: INTERNAL MEDICINE

## 2017-10-30 PROCEDURE — 87186 SC STD MICRODIL/AGAR DIL: CPT | Performed by: INTERNAL MEDICINE

## 2017-10-30 PROCEDURE — 87088 URINE BACTERIA CULTURE: CPT | Performed by: INTERNAL MEDICINE

## 2017-11-11 ENCOUNTER — APPOINTMENT (OUTPATIENT)
Dept: GENERAL RADIOLOGY | Facility: HOSPITAL | Age: 82
End: 2017-11-11
Attending: EMERGENCY MEDICINE
Payer: MEDICARE

## 2017-11-11 ENCOUNTER — HOSPITAL ENCOUNTER (EMERGENCY)
Facility: HOSPITAL | Age: 82
Discharge: HOME | End: 2017-11-11
Attending: EMERGENCY MEDICINE
Payer: MEDICARE

## 2017-11-11 VITALS
HEIGHT: 60 IN | OXYGEN SATURATION: 100 % | SYSTOLIC BLOOD PRESSURE: 129 MMHG | DIASTOLIC BLOOD PRESSURE: 90 MMHG | BODY MASS INDEX: 24.54 KG/M2 | WEIGHT: 125 LBS | RESPIRATION RATE: 16 BRPM | TEMPERATURE: 98 F | HEART RATE: 75 BPM

## 2017-11-11 DIAGNOSIS — K59.00 CONSTIPATION, UNSPECIFIED CONSTIPATION TYPE: Primary | ICD-10-CM

## 2017-11-11 PROCEDURE — 99283 EMERGENCY DEPT VISIT LOW MDM: CPT

## 2017-11-11 PROCEDURE — 82272 OCCULT BLD FECES 1-3 TESTS: CPT

## 2017-11-11 PROCEDURE — 74020 XR ABDOMEN, OBSTRUCTIVE SERIES (CPT=74020): CPT | Performed by: EMERGENCY MEDICINE

## 2017-11-11 RX ORDER — MAGNESIUM CARB/ALUMINUM HYDROX 105-160MG
296 TABLET,CHEWABLE ORAL ONCE
Qty: 296 ML | Refills: 0 | Status: SHIPPED | OUTPATIENT
Start: 2017-11-11 | End: 2017-11-11

## 2017-11-11 NOTE — ED NOTES
Pt not able to tolerate soap suds enema well. Approx 500mls administered. BSC at bedside. Call light within reach.

## 2017-11-11 NOTE — ED INITIAL ASSESSMENT (HPI)
Complaint of \"constipation\" for the past four days. Patient said he had a \"little piece of bowel movement\" today after taking Colace for the past couple days. Patient said she has been taking Miralax for the past 4 days.  Patient mentioned she just fini

## 2017-11-11 NOTE — ED PROVIDER NOTES
Patient Seen in: [facility] Emergency Department    History   Patient presents with:  Constipation (gastrointestinal)    Stated Complaint: constipation    HPI    68-year-old female presents emergency room with chief complaint of constipation.   Solomon SURGERY Bilateral      Comment: bilateral hips  No date: HYSTERECTOMY      Comment: 11/06 vag hysterectomy with sling  No date: TONSILLECTOMY  No date: TOTAL HIP REPLACEMENT      Comment:  left  7/07   , right 6/09        Smoking status: Never Smoker flexion intact    ED Course   Labs Reviewed - No data to display    ED Course as of Nov 11 1703  ------------------------------------------------------------       MDM         Discussed all findings with the patient, patient did have soapsuds enema, after

## 2017-11-14 PROCEDURE — 87088 URINE BACTERIA CULTURE: CPT | Performed by: INTERNAL MEDICINE

## 2017-11-14 PROCEDURE — 87086 URINE CULTURE/COLONY COUNT: CPT | Performed by: INTERNAL MEDICINE

## 2017-11-14 PROCEDURE — 87186 SC STD MICRODIL/AGAR DIL: CPT | Performed by: INTERNAL MEDICINE

## 2018-01-13 PROCEDURE — 87086 URINE CULTURE/COLONY COUNT: CPT | Performed by: FAMILY MEDICINE

## 2018-03-08 PROCEDURE — 87186 SC STD MICRODIL/AGAR DIL: CPT | Performed by: INTERNAL MEDICINE

## 2018-03-08 PROCEDURE — 87086 URINE CULTURE/COLONY COUNT: CPT | Performed by: INTERNAL MEDICINE

## 2018-03-08 PROCEDURE — 87088 URINE BACTERIA CULTURE: CPT | Performed by: INTERNAL MEDICINE

## 2018-03-17 ENCOUNTER — HOSPITAL ENCOUNTER (EMERGENCY)
Facility: HOSPITAL | Age: 83
Discharge: HOME | End: 2018-03-17
Attending: EMERGENCY MEDICINE
Payer: MEDICARE

## 2018-03-17 VITALS
DIASTOLIC BLOOD PRESSURE: 89 MMHG | TEMPERATURE: 98 F | RESPIRATION RATE: 18 BRPM | SYSTOLIC BLOOD PRESSURE: 159 MMHG | HEART RATE: 74 BPM | OXYGEN SATURATION: 99 %

## 2018-03-17 DIAGNOSIS — Z79.01 ANTICOAGULATED: Primary | ICD-10-CM

## 2018-03-17 DIAGNOSIS — R58 ACUTE BLEEDING: ICD-10-CM

## 2018-03-17 PROCEDURE — 99283 EMERGENCY DEPT VISIT LOW MDM: CPT

## 2018-03-17 NOTE — ED NOTES
Dr Ailyn Mosher peels off a blood clot leeched onto the surgical site. Gel foam pressed to area, pinned down with a tegraderm. Bleeding controlled at this time.

## 2018-03-17 NOTE — ED PROVIDER NOTES
Patient Seen in: [facility] Emergency Department    History   Patient presents with:  Postop/Procedure Problem    Stated Complaint: post op complication    HPI    Bleeding from wound on the left side of her face, at her jawline, patient had a biopsy , right 6/09        Smoking status: Never Smoker                                                              Smokeless tobacco: Never Used                      Alcohol use:  No                Review of Systems    Positive for stated complaint: post op comp

## 2018-03-17 NOTE — ED INITIAL ASSESSMENT (HPI)
Pt reports waking up at 0330 this morning in a blood-soaked nightgown and bed sheets, tracing the source back to her chin, the site where she had a mole removed one week ago. She is taking Xarelto.

## 2018-03-18 ENCOUNTER — HOSPITAL ENCOUNTER (EMERGENCY)
Facility: HOSPITAL | Age: 83
Discharge: HOME | End: 2018-03-18
Attending: EMERGENCY MEDICINE
Payer: MEDICARE

## 2018-03-18 VITALS
RESPIRATION RATE: 16 BRPM | OXYGEN SATURATION: 98 % | TEMPERATURE: 98 F | DIASTOLIC BLOOD PRESSURE: 62 MMHG | HEART RATE: 75 BPM | BODY MASS INDEX: 22.15 KG/M2 | SYSTOLIC BLOOD PRESSURE: 125 MMHG | HEIGHT: 63 IN | WEIGHT: 125 LBS

## 2018-03-18 DIAGNOSIS — T14.8XXA BLEEDING FROM WOUND: Primary | ICD-10-CM

## 2018-03-18 PROCEDURE — 99283 EMERGENCY DEPT VISIT LOW MDM: CPT

## 2018-03-18 NOTE — ED PROVIDER NOTES
Patient Seen in: [facility] Emergency Department    History   Patient presents with:  Bleeding (hematologic)  Laceration Abrasion (integumentary)    Stated Complaint: bleed/lac to left face. was here yesterday for the same.  reports recent procedu* HIP REPLACEMENT SURGERY Bilateral      Comment: bilateral hips  No date: HYSTERECTOMY      Comment: 11/06 vag hysterectomy with sling  No date: TONSILLECTOMY  No date: TOTAL HIP REPLACEMENT      Comment:  left  7/07   , right 6/09        Smoking status: Ne diagnosis)    Disposition:  Discharge  3/18/2018  2:01 pm    Follow-up:        Follow-up with a dermatologist that did the procedure Monday or Tuesday.         Medications Prescribed:  Current Discharge Medication List

## 2018-03-18 NOTE — ED NOTES
Dressing has been applied to wound, no bleeding at this time observed. Son at bedside to drive patient home. No distress observed. Educated on wound care and follow up.

## 2018-03-18 NOTE — ED INITIAL ASSESSMENT (HPI)
Patient here for increased bleeding to left side of face to site of punch biopsy near jawline. Reports she thinks it started again during the night. Treated yesterday with gelfoam dressing.

## 2018-03-20 PROCEDURE — 87186 SC STD MICRODIL/AGAR DIL: CPT | Performed by: INTERNAL MEDICINE

## 2018-03-20 PROCEDURE — 87086 URINE CULTURE/COLONY COUNT: CPT | Performed by: INTERNAL MEDICINE

## 2018-03-20 PROCEDURE — 87088 URINE BACTERIA CULTURE: CPT | Performed by: INTERNAL MEDICINE

## 2018-04-20 PROCEDURE — 87186 SC STD MICRODIL/AGAR DIL: CPT | Performed by: INTERNAL MEDICINE

## 2018-04-20 PROCEDURE — 87086 URINE CULTURE/COLONY COUNT: CPT | Performed by: INTERNAL MEDICINE

## 2018-04-20 PROCEDURE — 87088 URINE BACTERIA CULTURE: CPT | Performed by: INTERNAL MEDICINE

## 2018-06-04 PROCEDURE — 87086 URINE CULTURE/COLONY COUNT: CPT | Performed by: INTERNAL MEDICINE

## 2018-06-12 PROCEDURE — 87088 URINE BACTERIA CULTURE: CPT | Performed by: INTERNAL MEDICINE

## 2018-06-12 PROCEDURE — 87086 URINE CULTURE/COLONY COUNT: CPT | Performed by: INTERNAL MEDICINE

## 2018-06-12 PROCEDURE — 87186 SC STD MICRODIL/AGAR DIL: CPT | Performed by: INTERNAL MEDICINE

## 2018-06-20 PROBLEM — N39.0 URINARY TRACT INFECTION: Status: ACTIVE | Noted: 2017-03-11

## 2018-07-31 PROCEDURE — 36415 COLL VENOUS BLD VENIPUNCTURE: CPT | Performed by: INTERNAL MEDICINE

## 2018-07-31 PROCEDURE — 87086 URINE CULTURE/COLONY COUNT: CPT | Performed by: PHYSICIAN ASSISTANT

## 2018-07-31 PROCEDURE — 87088 URINE BACTERIA CULTURE: CPT | Performed by: PHYSICIAN ASSISTANT

## 2018-07-31 PROCEDURE — 87186 SC STD MICRODIL/AGAR DIL: CPT | Performed by: PHYSICIAN ASSISTANT

## 2018-07-31 PROCEDURE — 86480 TB TEST CELL IMMUN MEASURE: CPT | Performed by: INTERNAL MEDICINE

## 2018-08-22 PROCEDURE — 87186 SC STD MICRODIL/AGAR DIL: CPT | Performed by: PHYSICIAN ASSISTANT

## 2018-08-22 PROCEDURE — 87086 URINE CULTURE/COLONY COUNT: CPT | Performed by: PHYSICIAN ASSISTANT

## 2018-08-22 PROCEDURE — 87077 CULTURE AEROBIC IDENTIFY: CPT | Performed by: PHYSICIAN ASSISTANT

## 2018-09-07 PROCEDURE — 81001 URINALYSIS AUTO W/SCOPE: CPT | Performed by: PHYSICIAN ASSISTANT

## 2018-09-07 PROCEDURE — 87088 URINE BACTERIA CULTURE: CPT | Performed by: PHYSICIAN ASSISTANT

## 2018-09-07 PROCEDURE — 87186 SC STD MICRODIL/AGAR DIL: CPT | Performed by: PHYSICIAN ASSISTANT

## 2018-09-07 PROCEDURE — 87086 URINE CULTURE/COLONY COUNT: CPT | Performed by: PHYSICIAN ASSISTANT

## 2018-09-21 PROCEDURE — 87086 URINE CULTURE/COLONY COUNT: CPT | Performed by: PHYSICIAN ASSISTANT

## 2018-11-10 PROCEDURE — 87077 CULTURE AEROBIC IDENTIFY: CPT | Performed by: PHYSICIAN ASSISTANT

## 2018-11-10 PROCEDURE — 87186 SC STD MICRODIL/AGAR DIL: CPT | Performed by: PHYSICIAN ASSISTANT

## 2018-11-10 PROCEDURE — 87086 URINE CULTURE/COLONY COUNT: CPT | Performed by: PHYSICIAN ASSISTANT

## 2019-01-01 ENCOUNTER — HOSPITAL ENCOUNTER (EMERGENCY)
Facility: HOSPITAL | Age: 84
Discharge: HOME | End: 2019-01-01
Attending: EMERGENCY MEDICINE
Payer: MEDICARE

## 2019-01-01 ENCOUNTER — HOSPITAL ENCOUNTER (OUTPATIENT)
Facility: HOSPITAL | Age: 84
Setting detail: OBSERVATION
Discharge: NURSING, REHAB OR LONG-TERM CARE | End: 2019-01-01
Attending: EMERGENCY MEDICINE | Admitting: INTERNAL MEDICINE
Payer: MEDICARE

## 2019-01-01 ENCOUNTER — APPOINTMENT (OUTPATIENT)
Dept: CT IMAGING | Facility: HOSPITAL | Age: 84
End: 2019-01-01
Attending: EMERGENCY MEDICINE
Payer: MEDICARE

## 2019-01-01 ENCOUNTER — APPOINTMENT (OUTPATIENT)
Dept: GENERAL RADIOLOGY | Facility: HOSPITAL | Age: 84
End: 2019-01-01
Attending: EMERGENCY MEDICINE
Payer: MEDICARE

## 2019-01-01 VITALS
HEIGHT: 63 IN | DIASTOLIC BLOOD PRESSURE: 78 MMHG | BODY MASS INDEX: 21.26 KG/M2 | SYSTOLIC BLOOD PRESSURE: 138 MMHG | HEART RATE: 81 BPM | RESPIRATION RATE: 18 BRPM | OXYGEN SATURATION: 96 % | WEIGHT: 120 LBS | TEMPERATURE: 97 F

## 2019-01-01 VITALS
DIASTOLIC BLOOD PRESSURE: 53 MMHG | HEART RATE: 82 BPM | RESPIRATION RATE: 16 BRPM | HEIGHT: 62.99 IN | WEIGHT: 122 LBS | BODY MASS INDEX: 21.62 KG/M2 | SYSTOLIC BLOOD PRESSURE: 109 MMHG | TEMPERATURE: 98 F | OXYGEN SATURATION: 99 %

## 2019-01-01 DIAGNOSIS — K92.1 BLACK STOOL: Primary | ICD-10-CM

## 2019-01-01 DIAGNOSIS — R77.8 ELEVATED TROPONIN: ICD-10-CM

## 2019-01-01 DIAGNOSIS — N39.0 SEPSIS DUE TO URINARY TRACT INFECTION (HCC): Primary | ICD-10-CM

## 2019-01-01 DIAGNOSIS — A41.9 SEPSIS DUE TO URINARY TRACT INFECTION (HCC): Primary | ICD-10-CM

## 2019-01-01 LAB
ALBUMIN SERPL-MCNC: 3.7 G/DL (ref 3.4–5)
ALBUMIN/GLOB SERPL: 1.3 {RATIO} (ref 1–2)
ALP LIVER SERPL-CCNC: 80 U/L (ref 55–142)
ALT SERPL-CCNC: 21 U/L (ref 13–56)
ANION GAP SERPL CALC-SCNC: 6 MMOL/L (ref 0–18)
ANTIBODY SCREEN: NEGATIVE
APTT PPP: 31.5 SECONDS (ref 25.4–36.1)
AST SERPL-CCNC: 20 U/L (ref 15–37)
ATRIAL RATE: 89 BPM
BASOPHILS # BLD AUTO: 0.02 X10(3) UL (ref 0–0.2)
BASOPHILS NFR BLD AUTO: 0.3 %
BILIRUB SERPL-MCNC: 0.6 MG/DL (ref 0.1–2)
BUN BLD-MCNC: 17 MG/DL (ref 7–18)
BUN/CREAT SERPL: 23.6 (ref 10–20)
CALCIUM BLD-MCNC: 9 MG/DL (ref 8.5–10.1)
CHLORIDE SERPL-SCNC: 103 MMOL/L (ref 98–112)
CO2 SERPL-SCNC: 28 MMOL/L (ref 21–32)
CREAT BLD-MCNC: 0.72 MG/DL (ref 0.55–1.02)
DEPRECATED RDW RBC AUTO: 46 FL (ref 35.1–46.3)
EOSINOPHIL # BLD AUTO: 0.04 X10(3) UL (ref 0–0.7)
EOSINOPHIL NFR BLD AUTO: 0.6 %
ERYTHROCYTE [DISTWIDTH] IN BLOOD BY AUTOMATED COUNT: 14.3 % (ref 11–15)
GLOBULIN PLAS-MCNC: 2.9 G/DL (ref 2.8–4.4)
GLUCOSE BLD-MCNC: 97 MG/DL (ref 70–99)
HCT VFR BLD AUTO: 38.6 % (ref 35–48)
HGB BLD-MCNC: 12.5 G/DL (ref 12–16)
IMM GRANULOCYTES # BLD AUTO: 0.01 X10(3) UL (ref 0–1)
IMM GRANULOCYTES NFR BLD: 0.2 %
INR BLD: 1.13 (ref 0.9–1.1)
LYMPHOCYTES # BLD AUTO: 1.19 X10(3) UL (ref 1–4)
LYMPHOCYTES NFR BLD AUTO: 19.1 %
M PROTEIN MFR SERPL ELPH: 6.6 G/DL (ref 6.4–8.2)
MCH RBC QN AUTO: 28.2 PG (ref 26–34)
MCHC RBC AUTO-ENTMCNC: 32.4 G/DL (ref 31–37)
MCV RBC AUTO: 87.1 FL (ref 80–100)
MONOCYTES # BLD AUTO: 0.56 X10(3) UL (ref 0.1–1)
MONOCYTES NFR BLD AUTO: 9 %
NEUTROPHILS # BLD AUTO: 4.41 X10 (3) UL (ref 1.5–7.7)
NEUTROPHILS # BLD AUTO: 4.41 X10(3) UL (ref 1.5–7.7)
NEUTROPHILS NFR BLD AUTO: 70.8 %
OSMOLALITY SERPL CALC.SUM OF ELEC: 285 MOSM/KG (ref 275–295)
P AXIS: 29 DEGREES
P-R INTERVAL: 146 MS
PLATELET # BLD AUTO: 187 10(3)UL (ref 150–450)
POTASSIUM SERPL-SCNC: 4.4 MMOL/L (ref 3.5–5.1)
PSA SERPL DL<=0.01 NG/ML-MCNC: 15 SECONDS (ref 12.5–14.7)
Q-T INTERVAL: 368 MS
QRS DURATION: 78 MS
QTC CALCULATION (BEZET): 447 MS
R AXIS: -27 DEGREES
RBC # BLD AUTO: 4.43 X10(6)UL (ref 3.8–5.3)
RH BLOOD TYPE: POSITIVE
SODIUM SERPL-SCNC: 137 MMOL/L (ref 136–145)
T AXIS: 36 DEGREES
VENTRICULAR RATE: 89 BPM
WBC # BLD AUTO: 6.2 X10(3) UL (ref 4–11)

## 2019-01-01 PROCEDURE — 99285 EMERGENCY DEPT VISIT HI MDM: CPT

## 2019-01-01 PROCEDURE — 87186 SC STD MICRODIL/AGAR DIL: CPT | Performed by: INTERNAL MEDICINE

## 2019-01-01 PROCEDURE — 96375 TX/PRO/DX INJ NEW DRUG ADDON: CPT

## 2019-01-01 PROCEDURE — 85025 COMPLETE CBC W/AUTO DIFF WBC: CPT | Performed by: EMERGENCY MEDICINE

## 2019-01-01 PROCEDURE — 86901 BLOOD TYPING SEROLOGIC RH(D): CPT | Performed by: EMERGENCY MEDICINE

## 2019-01-01 PROCEDURE — 93010 ELECTROCARDIOGRAM REPORT: CPT

## 2019-01-01 PROCEDURE — 99284 EMERGENCY DEPT VISIT MOD MDM: CPT

## 2019-01-01 PROCEDURE — 80048 BASIC METABOLIC PNL TOTAL CA: CPT | Performed by: INTERNAL MEDICINE

## 2019-01-01 PROCEDURE — 80053 COMPREHEN METABOLIC PANEL: CPT | Performed by: EMERGENCY MEDICINE

## 2019-01-01 PROCEDURE — 87186 SC STD MICRODIL/AGAR DIL: CPT | Performed by: EMERGENCY MEDICINE

## 2019-01-01 PROCEDURE — 84484 ASSAY OF TROPONIN QUANT: CPT | Performed by: INTERNAL MEDICINE

## 2019-01-01 PROCEDURE — 82272 OCCULT BLD FECES 1-3 TESTS: CPT

## 2019-01-01 PROCEDURE — C9113 INJ PANTOPRAZOLE SODIUM, VIA: HCPCS | Performed by: EMERGENCY MEDICINE

## 2019-01-01 PROCEDURE — 87088 URINE BACTERIA CULTURE: CPT | Performed by: INTERNAL MEDICINE

## 2019-01-01 PROCEDURE — 84484 ASSAY OF TROPONIN QUANT: CPT | Performed by: EMERGENCY MEDICINE

## 2019-01-01 PROCEDURE — 74019 RADEX ABDOMEN 2 VIEWS: CPT | Performed by: EMERGENCY MEDICINE

## 2019-01-01 PROCEDURE — 71046 X-RAY EXAM CHEST 2 VIEWS: CPT | Performed by: EMERGENCY MEDICINE

## 2019-01-01 PROCEDURE — 87077 CULTURE AEROBIC IDENTIFY: CPT | Performed by: EMERGENCY MEDICINE

## 2019-01-01 PROCEDURE — 87086 URINE CULTURE/COLONY COUNT: CPT | Performed by: EMERGENCY MEDICINE

## 2019-01-01 PROCEDURE — 81001 URINALYSIS AUTO W/SCOPE: CPT | Performed by: INTERNAL MEDICINE

## 2019-01-01 PROCEDURE — 87086 URINE CULTURE/COLONY COUNT: CPT | Performed by: INTERNAL MEDICINE

## 2019-01-01 PROCEDURE — 97162 PT EVAL MOD COMPLEX 30 MIN: CPT

## 2019-01-01 PROCEDURE — 93005 ELECTROCARDIOGRAM TRACING: CPT

## 2019-01-01 PROCEDURE — 87077 CULTURE AEROBIC IDENTIFY: CPT | Performed by: PHYSICIAN ASSISTANT

## 2019-01-01 PROCEDURE — 70450 CT HEAD/BRAIN W/O DYE: CPT | Performed by: EMERGENCY MEDICINE

## 2019-01-01 PROCEDURE — 85610 PROTHROMBIN TIME: CPT | Performed by: EMERGENCY MEDICINE

## 2019-01-01 PROCEDURE — 97116 GAIT TRAINING THERAPY: CPT

## 2019-01-01 PROCEDURE — 81001 URINALYSIS AUTO W/SCOPE: CPT | Performed by: EMERGENCY MEDICINE

## 2019-01-01 PROCEDURE — 96374 THER/PROPH/DIAG INJ IV PUSH: CPT

## 2019-01-01 PROCEDURE — 85730 THROMBOPLASTIN TIME PARTIAL: CPT | Performed by: EMERGENCY MEDICINE

## 2019-01-01 PROCEDURE — 87186 SC STD MICRODIL/AGAR DIL: CPT | Performed by: PHYSICIAN ASSISTANT

## 2019-01-01 PROCEDURE — 85025 COMPLETE CBC W/AUTO DIFF WBC: CPT | Performed by: INTERNAL MEDICINE

## 2019-01-01 PROCEDURE — 86900 BLOOD TYPING SEROLOGIC ABO: CPT | Performed by: EMERGENCY MEDICINE

## 2019-01-01 PROCEDURE — 96361 HYDRATE IV INFUSION ADD-ON: CPT

## 2019-01-01 PROCEDURE — 86850 RBC ANTIBODY SCREEN: CPT | Performed by: EMERGENCY MEDICINE

## 2019-01-01 PROCEDURE — 87086 URINE CULTURE/COLONY COUNT: CPT | Performed by: PHYSICIAN ASSISTANT

## 2019-01-01 PROCEDURE — 96365 THER/PROPH/DIAG IV INF INIT: CPT

## 2019-01-01 RX ORDER — ASPIRIN 81 MG/1
81 TABLET ORAL
Status: DISCONTINUED | OUTPATIENT
Start: 2019-01-01 | End: 2019-01-01

## 2019-01-01 RX ORDER — ACETAMINOPHEN 325 MG/1
325 TABLET ORAL EVERY 6 HOURS PRN
COMMUNITY

## 2019-01-01 RX ORDER — CEFADROXIL 500 MG/1
500 CAPSULE ORAL 2 TIMES DAILY
Qty: 10 CAPSULE | Refills: 0 | Status: SHIPPED | OUTPATIENT
Start: 2019-01-01 | End: 2019-01-01

## 2019-01-01 RX ORDER — SODIUM CHLORIDE 9 MG/ML
125 INJECTION, SOLUTION INTRAVENOUS CONTINUOUS
Status: DISCONTINUED | OUTPATIENT
Start: 2019-01-01 | End: 2019-01-01

## 2019-01-01 RX ORDER — ACETAMINOPHEN 325 MG/1
650 TABLET ORAL EVERY 6 HOURS PRN
Status: DISCONTINUED | OUTPATIENT
Start: 2019-01-01 | End: 2019-01-01

## 2019-01-01 RX ORDER — PANTOPRAZOLE SODIUM 40 MG/1
40 TABLET, DELAYED RELEASE ORAL DAILY
Qty: 21 TABLET | Refills: 0 | Status: SHIPPED | OUTPATIENT
Start: 2019-01-01 | End: 2019-01-01

## 2019-01-01 RX ORDER — MAGNESIUM OXIDE 400 MG (241.3 MG MAGNESIUM) TABLET
2 TABLET NIGHTLY PRN
Status: DISCONTINUED | OUTPATIENT
Start: 2019-01-01 | End: 2019-01-01

## 2019-01-01 RX ORDER — ONDANSETRON 2 MG/ML
4 INJECTION INTRAMUSCULAR; INTRAVENOUS EVERY 6 HOURS PRN
Status: DISCONTINUED | OUTPATIENT
Start: 2019-01-01 | End: 2019-01-01

## 2019-01-01 RX ORDER — SODIUM CHLORIDE 9 MG/ML
1000 INJECTION, SOLUTION INTRAVENOUS ONCE
Status: COMPLETED | OUTPATIENT
Start: 2019-01-01 | End: 2019-01-01

## 2019-02-05 PROCEDURE — 87086 URINE CULTURE/COLONY COUNT: CPT | Performed by: PHYSICIAN ASSISTANT

## 2019-07-17 NOTE — CONSULTS
[facility]  Report of Consultation    Sukhwilber Terri Patient Status:  Emergency    1929 MRN [id number]   Location [address] [facility] Attending Devi Mahmood MD   Hosp Day # 0 PCP Tolu De La Fuente MD     Reason for the procedure unless specified by the patient's cardiologist or primary care physician.   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 Disp: 90 tablet Rfl: 3   rivaroxaban 15 MG Oral Tab Take 1 tablet (15 mg total) by mouth daily with food.  Disp: 90 tablet Rfl: 3   denosumab 60 MG/ML Subcutaneous Solution Inject 1 mL (60 mg total) into th nondistended, no G/R/R  RECTAL: Pt deferred repeat kait w/ me  EXTREMITIES: no le edema  NEURO:  Oriented x 3   BACK: no CVA tenderness  PSYCH: appropriate for the exam, pleasant          LAB/IMAGING RESULTS:     Lab Results   Component Value Date    PTT 31

## 2019-07-17 NOTE — ED PROVIDER NOTES
Patient Seen in: [facility] Emergency Department    History   Patient presents with:  GI Bleeding (gastrointestinal)    Stated Complaint:     HPI    Patient is a 72-year-old female who presents emergency room with a history of 2 episodes of dark sto TONSILLECTOMY     • TOTAL HIP REPLACEMENT       left  7/07   , right 6/09           Social History    Tobacco Use      Smoking status: Never Smoker      Smokeless tobacco: Never Used    Alcohol use: No      Alcohol/week: 0.0 oz    Drug use: No      Review time place and person. Motor strength is 5 over 5 in all 4 extremities. There are no gross motor or sensory deficits appreciated. Cranial nerves II through XII are intact.              ED Course     Labs Reviewed   COMP METABOLIC PANEL (14) - Abnormal; Nota 7/17/2019  CONCLUSION:  Nonobstructive bowel gas pattern. Dictated by: Adelfo Valentin MD on 7/17/2019 at 13:00     Approved by: Adelfo Valentin MD         EKG shows normal sinus rhythm heart rate of 89 and nonspecific ST changes.     EKG    Rate, interval total) by mouth daily. , Print Script, Disp-21 tablet, RTelinet

## 2019-11-28 PROBLEM — N39.0 SEPSIS DUE TO URINARY TRACT INFECTION (HCC): Status: ACTIVE | Noted: 2019-01-01

## 2019-11-28 PROBLEM — A41.9 SEPSIS DUE TO URINARY TRACT INFECTION (HCC): Status: ACTIVE | Noted: 2019-01-01

## 2019-11-28 PROBLEM — R77.8 ELEVATED TROPONIN: Status: ACTIVE | Noted: 2019-01-01

## 2019-11-28 NOTE — ED INITIAL ASSESSMENT (HPI)
Pt states at 1000am left arm felt heavy, last time she had a uti her leg felt heavy, states she couldn't purposefully move it, states had uti for months

## 2019-11-28 NOTE — ED NOTES
md was asked if pt was truly being admitted for sepsis as she does not have an elevated wbc count or fever at present. md stated disregard the sepsis dx.

## 2019-11-28 NOTE — ED PROVIDER NOTES
Patient Seen in: [facility] Emergency Department      History   Patient presents with:  Numbness Weakness (neurologic)    Stated Complaint: left arm weakness that has resolved     HPI    Zohra Alegria is a pleasant 57-year-old female coming with complaint 7/07   , right 6/09                    Social History    Tobacco Use      Smoking status: Never Smoker      Smokeless tobacco: Never Used    Alcohol use: No      Alcohol/week: 0.0 standard drinks    Drug use:  No             Review of Systems    Positive fo -----------         ------                     CBC W/ DIFFERENTIAL[[id number]]          Abnormal            Final result                 Please view results for these tests on the individual orders.    URINALYSIS WITH CULTURE REFLEX   GIBRAN DRAW BLUE

## 2019-11-28 NOTE — H&P
[facility] Hospitalist History and Physical      Patient presents with:  Numbness Weakness (neurologic)       PCP: Efra Vicente MD      History of Present Illness: Patient is a 80year old female with PMH sig for pAF on xarelto, HTN, TIA, and osteoporos Procedure Laterality Date   • COLONOSCOPY N/A 9/15/2017    Performed by Prem Bojorquez MD at [address] Bilateral     bilateral hips   • HYSTERECTOMY      11/06 vag hysterectomy with sling   • OTHER SURGICAL HISTORY  06/20 MG/ML Subcutaneous Solution, Inject 1 mL (60 mg total) into the skin every 6 (six) months., Disp: 1 Syringe, Rfl: 0  Probiotic Product (ALIGN OR), Take 1 capsule by mouth daily.   , Disp: , Rfl:   LONGS ADULT LOW STRENGTH ASA 81 MG OR TBEC, 1 TABLET DAILY, 11/28/2019    ALKPHO 82 11/28/2019    BILT 0.4 11/28/2019    TP 7.0 11/28/2019    AST 21 11/28/2019    ALT 22 11/28/2019    TROP 0.063 11/28/2019       CXR: image personally reviewed.       Radiology: Ct Brain Or Head ([id number])    Result Date: 11/28/2019  PRO while trying to use her walker.     #Transient LUE weakness  #Hx of TIA  - suspect due to UTI  - pt already on baby ASA and xarelto, continue   - neuro checks Q4  - hold off on neuro consult for now  - PT eval    #Acute cystitis with hematuria   - suspect t

## 2019-11-29 NOTE — PLAN OF CARE
NURSING ADMISSION NOTE      Patient admitted via Cart  Oriented to room. Safety precautions initiated. Bed in low position. Call light in reach. Received patient a/ox4. VSS. She no longer has weakness to left side.   She states she is feelings sli

## 2019-11-29 NOTE — PLAN OF CARE
Pt A&Ox3 Room Air  Resting in bed  Denies pain at this time  Neuro checks Q4, per MD hold off on neuro consult  Abx rocephine Q24  PT/OT to see pt  Safety precaution maintained  Will continue to monitor    Problem: Patient/Family Goals  Goal: Patient/Famil

## 2019-11-29 NOTE — PHYSICAL THERAPY NOTE
PHYSICAL THERAPY EVALUATION - INPATIENT     Room Number: 7849/8566-R  Evaluation Date: 11/29/2019  Type of Evaluation: Initial  Physician Order: PT Eval and Treat    Presenting Problem: transient LUE weakness; UTI  Reason for Therapy: Mobility Dysfun vag hysterectomy with sling   • OTHER SURGICAL HISTORY  06/20/2018    Cysto - Dr María Nino   • TONSILLECTOMY     • TOTAL HIP REPLACEMENT       left  7/07   , right 6/09       HOME SITUATION  Type of Home: Assisted living facility([place])                    Live up from a chair with arms (e.g., wheelchair, bedside commode, etc.): A Little   -   Moving from lying on back to sitting on the side of the bed?: A Little   How much help from another person does the patient currently need. ..   -   Moving to and from a bed a 80year old female admitted on 11/28/2019 for transient LUE weakness; UTI. Pertinent comorbidities and personal factors impacting therapy include HTN, osteoporosis, A-fib, TIA, compression fractures T12 and L2, bilateral THR (2007, 2009).   In this PT ev

## 2019-11-29 NOTE — CM/SW NOTE
9: 12am  Per bedside RN Pt is alert. Pt is from [facility] [phone]. MSW spoke to [facility] - pt is Assisted Living and gets Out Patient Physical Therapy (in house provider). Pt will need orders at [facility]. Rn to call report  [phone]. 0

## 2019-11-29 NOTE — PROGRESS NOTES
NURSING DISCHARGE NOTE    Discharged Other, (see nursing note) via Wheelchair. Accompanied by Family member  Belongings Taken by patient/family. Patient discharged to [facility]. IV and telemetry was removed.   Discharge paperwork was Mo Ramos

## 2019-11-29 NOTE — DISCHARGE SUMMARY
General Medicine Discharge Summary     Patient ID:  Rehana Garza  80year old  4/22/1929    Admit date: 11/28/2019    Discharge date and time: 11/29/2019    Attending Physician: Thiago Mauricio DO Tab  Take 2 mg by mouth nightly as needed. acetaminophen 325 MG Oral Tab  Take 325 mg by mouth every 6 (six) hours as needed for Pain (pain). Patient states takes 1-2 tables if needed    Cholecalciferol (D3 VITAMIN OR)  Take 3 tablets by mouth daily.

## 2020-01-01 ENCOUNTER — HOSPITAL ENCOUNTER (INPATIENT)
Facility: HOSPITAL | Age: 85
LOS: 1 days | Discharge: HOSPICE | DRG: 087 | End: 2020-01-01
Attending: EMERGENCY MEDICINE | Admitting: HOSPITALIST
Payer: MEDICARE

## 2020-01-01 ENCOUNTER — HOSPITAL ENCOUNTER (OUTPATIENT)
Facility: HOSPITAL | Age: 85
Setting detail: OBSERVATION
LOS: 1 days | Discharge: HOME | End: 2020-01-01
Attending: EMERGENCY MEDICINE | Admitting: HOSPITALIST
Payer: MEDICARE

## 2020-01-01 ENCOUNTER — APPOINTMENT (OUTPATIENT)
Dept: GENERAL RADIOLOGY | Facility: HOSPITAL | Age: 85
DRG: 087 | End: 2020-01-01
Attending: EMERGENCY MEDICINE
Payer: MEDICARE

## 2020-01-01 ENCOUNTER — HOSPITAL ENCOUNTER (INPATIENT)
Facility: HOSPITAL | Age: 85
LOS: 1 days | DRG: 087 | End: 2020-01-01
Attending: HOSPITALIST | Admitting: HOSPITALIST
Payer: OTHER MISCELLANEOUS

## 2020-01-01 ENCOUNTER — APPOINTMENT (OUTPATIENT)
Dept: CT IMAGING | Facility: HOSPITAL | Age: 85
DRG: 087 | End: 2020-01-01
Attending: EMERGENCY MEDICINE
Payer: MEDICARE

## 2020-01-01 VITALS
BODY MASS INDEX: 21.06 KG/M2 | WEIGHT: 114.44 LBS | SYSTOLIC BLOOD PRESSURE: 128 MMHG | HEART RATE: 121 BPM | RESPIRATION RATE: 20 BRPM | DIASTOLIC BLOOD PRESSURE: 70 MMHG | HEIGHT: 62 IN | OXYGEN SATURATION: 96 % | TEMPERATURE: 99 F

## 2020-01-01 VITALS
TEMPERATURE: 98 F | SYSTOLIC BLOOD PRESSURE: 143 MMHG | WEIGHT: 114.38 LBS | DIASTOLIC BLOOD PRESSURE: 68 MMHG | OXYGEN SATURATION: 97 % | BODY MASS INDEX: 20 KG/M2 | RESPIRATION RATE: 16 BRPM | HEART RATE: 67 BPM

## 2020-01-01 VITALS — DIASTOLIC BLOOD PRESSURE: 51 MMHG | SYSTOLIC BLOOD PRESSURE: 104 MMHG | TEMPERATURE: 101 F

## 2020-01-01 DIAGNOSIS — R31.9 URINARY TRACT INFECTION WITH HEMATURIA, SITE UNSPECIFIED: ICD-10-CM

## 2020-01-01 DIAGNOSIS — N39.0 URINARY TRACT INFECTION WITH HEMATURIA, SITE UNSPECIFIED: ICD-10-CM

## 2020-01-01 DIAGNOSIS — R29.6 UNWITNESSED FALL: Primary | ICD-10-CM

## 2020-01-01 DIAGNOSIS — I62.9 INTRACRANIAL HEMORRHAGE (HCC): ICD-10-CM

## 2020-01-01 DIAGNOSIS — S72.024A: ICD-10-CM

## 2020-01-01 DIAGNOSIS — Z79.01 ANTICOAGULATED BY ANTICOAGULATION TREATMENT: ICD-10-CM

## 2020-01-01 DIAGNOSIS — R55 SYNCOPE, UNSPECIFIED SYNCOPE TYPE: Primary | ICD-10-CM

## 2020-01-01 DIAGNOSIS — S02.0XXA CLOSED FRACTURE OF PARIETAL BONE, INITIAL ENCOUNTER (HCC): ICD-10-CM

## 2020-01-01 PROCEDURE — 96376 TX/PRO/DX INJ SAME DRUG ADON: CPT

## 2020-01-01 PROCEDURE — 70450 CT HEAD/BRAIN W/O DYE: CPT | Performed by: EMERGENCY MEDICINE

## 2020-01-01 PROCEDURE — 80048 BASIC METABOLIC PNL TOTAL CA: CPT | Performed by: INTERNAL MEDICINE

## 2020-01-01 PROCEDURE — 97110 THERAPEUTIC EXERCISES: CPT

## 2020-01-01 PROCEDURE — 83735 ASSAY OF MAGNESIUM: CPT | Performed by: INTERNAL MEDICINE

## 2020-01-01 PROCEDURE — 87086 URINE CULTURE/COLONY COUNT: CPT | Performed by: EMERGENCY MEDICINE

## 2020-01-01 PROCEDURE — 85025 COMPLETE CBC W/AUTO DIFF WBC: CPT | Performed by: INTERNAL MEDICINE

## 2020-01-01 PROCEDURE — 97165 OT EVAL LOW COMPLEX 30 MIN: CPT

## 2020-01-01 PROCEDURE — 93005 ELECTROCARDIOGRAM TRACING: CPT

## 2020-01-01 PROCEDURE — 97116 GAIT TRAINING THERAPY: CPT

## 2020-01-01 PROCEDURE — 80053 COMPREHEN METABOLIC PANEL: CPT | Performed by: EMERGENCY MEDICINE

## 2020-01-01 PROCEDURE — 85025 COMPLETE CBC W/AUTO DIFF WBC: CPT | Performed by: EMERGENCY MEDICINE

## 2020-01-01 PROCEDURE — 93010 ELECTROCARDIOGRAM REPORT: CPT | Performed by: EMERGENCY MEDICINE

## 2020-01-01 PROCEDURE — 99285 EMERGENCY DEPT VISIT HI MDM: CPT | Performed by: EMERGENCY MEDICINE

## 2020-01-01 PROCEDURE — 81001 URINALYSIS AUTO W/SCOPE: CPT | Performed by: EMERGENCY MEDICINE

## 2020-01-01 PROCEDURE — 97161 PT EVAL LOW COMPLEX 20 MIN: CPT

## 2020-01-01 PROCEDURE — 99222 1ST HOSP IP/OBS MODERATE 55: CPT | Performed by: NURSE PRACTITIONER

## 2020-01-01 PROCEDURE — 51701 INSERT BLADDER CATHETER: CPT | Performed by: EMERGENCY MEDICINE

## 2020-01-01 PROCEDURE — 84484 ASSAY OF TROPONIN QUANT: CPT | Performed by: INTERNAL MEDICINE

## 2020-01-01 PROCEDURE — 84484 ASSAY OF TROPONIN QUANT: CPT | Performed by: EMERGENCY MEDICINE

## 2020-01-01 PROCEDURE — 96361 HYDRATE IV INFUSION ADD-ON: CPT

## 2020-01-01 PROCEDURE — 72125 CT NECK SPINE W/O DYE: CPT | Performed by: EMERGENCY MEDICINE

## 2020-01-01 PROCEDURE — 99291 CRITICAL CARE FIRST HOUR: CPT | Performed by: INTERNAL MEDICINE

## 2020-01-01 PROCEDURE — 73502 X-RAY EXAM HIP UNI 2-3 VIEWS: CPT | Performed by: EMERGENCY MEDICINE

## 2020-01-01 PROCEDURE — 96374 THER/PROPH/DIAG INJ IV PUSH: CPT | Performed by: EMERGENCY MEDICINE

## 2020-01-01 PROCEDURE — 83735 ASSAY OF MAGNESIUM: CPT | Performed by: EMERGENCY MEDICINE

## 2020-01-01 RX ORDER — HEPARIN SODIUM 5000 [USP'U]/ML
5000 INJECTION, SOLUTION INTRAVENOUS; SUBCUTANEOUS EVERY 12 HOURS SCHEDULED
Status: DISCONTINUED | OUTPATIENT
Start: 2020-01-01 | End: 2020-01-01

## 2020-01-01 RX ORDER — BISACODYL 10 MG
10 SUPPOSITORY, RECTAL RECTAL
Status: DISCONTINUED | OUTPATIENT
Start: 2020-01-01 | End: 2020-06-04

## 2020-01-01 RX ORDER — ACETAMINOPHEN 325 MG/1
650 TABLET ORAL EVERY 6 HOURS PRN
Status: DISCONTINUED | OUTPATIENT
Start: 2020-01-01 | End: 2020-01-01

## 2020-01-01 RX ORDER — CHOLECALCIFEROL (VITAMIN D3) 125 MCG
500 CAPSULE ORAL DAILY
Status: ON HOLD | COMMUNITY
End: 2020-01-01

## 2020-01-01 RX ORDER — ASPIRIN 81 MG/1
81 TABLET ORAL
Status: DISCONTINUED | OUTPATIENT
Start: 2020-01-01 | End: 2020-01-01

## 2020-01-01 RX ORDER — SODIUM PHOSPHATE, DIBASIC AND SODIUM PHOSPHATE, MONOBASIC 7; 19 G/133ML; G/133ML
1 ENEMA RECTAL ONCE AS NEEDED
Status: DISCONTINUED | OUTPATIENT
Start: 2020-01-01 | End: 2020-01-01

## 2020-01-01 RX ORDER — AMIODARONE HYDROCHLORIDE 200 MG/1
200 TABLET ORAL DAILY
Status: DISCONTINUED | OUTPATIENT
Start: 2020-01-01 | End: 2020-01-01

## 2020-01-01 RX ORDER — SCOLOPAMINE TRANSDERMAL SYSTEM 1 MG/1
1 PATCH, EXTENDED RELEASE TRANSDERMAL
Status: DISCONTINUED | OUTPATIENT
Start: 2020-01-01 | End: 2020-06-04

## 2020-01-01 RX ORDER — METOPROLOL TARTRATE 5 MG/5ML
5 INJECTION INTRAVENOUS ONCE
Status: DISCONTINUED | OUTPATIENT
Start: 2020-01-01 | End: 2020-01-01

## 2020-01-01 RX ORDER — LORAZEPAM 2 MG/ML
2 INJECTION INTRAMUSCULAR EVERY 4 HOURS PRN
Status: DISCONTINUED | OUTPATIENT
Start: 2020-01-01 | End: 2020-06-04

## 2020-01-01 RX ORDER — ONDANSETRON 2 MG/ML
4 INJECTION INTRAMUSCULAR; INTRAVENOUS EVERY 6 HOURS PRN
Status: DISCONTINUED | OUTPATIENT
Start: 2020-01-01 | End: 2020-01-01

## 2020-01-01 RX ORDER — HALOPERIDOL 5 MG/ML
1 INJECTION INTRAMUSCULAR
Status: DISCONTINUED | OUTPATIENT
Start: 2020-01-01 | End: 2020-06-04

## 2020-01-01 RX ORDER — CEPHALEXIN 500 MG/1
500 CAPSULE ORAL 4 TIMES DAILY
COMMUNITY
Start: 2020-01-01

## 2020-01-01 RX ORDER — ONDANSETRON 2 MG/ML
4 INJECTION INTRAMUSCULAR; INTRAVENOUS EVERY 6 HOURS PRN
Status: DISCONTINUED | OUTPATIENT
Start: 2020-01-01 | End: 2020-06-04

## 2020-01-01 RX ORDER — LORAZEPAM 2 MG/ML
0.5 INJECTION INTRAMUSCULAR EVERY 4 HOURS PRN
Status: DISCONTINUED | OUTPATIENT
Start: 2020-01-01 | End: 2020-06-04

## 2020-01-01 RX ORDER — ACETAMINOPHEN 650 MG/1
650 SUPPOSITORY RECTAL EVERY 6 HOURS PRN
Status: DISCONTINUED | OUTPATIENT
Start: 2020-01-01 | End: 2020-06-04

## 2020-01-01 RX ORDER — METHENAMINE HIPPURATE 1000 MG/1
1 TABLET ORAL DAILY
COMMUNITY
End: 2020-01-01

## 2020-01-01 RX ORDER — BISACODYL 10 MG
10 SUPPOSITORY, RECTAL RECTAL
Status: DISCONTINUED | OUTPATIENT
Start: 2020-01-01 | End: 2020-01-01

## 2020-01-01 RX ORDER — CHLORAL HYDRATE 500 MG
1000 CAPSULE ORAL DAILY
Status: ON HOLD | COMMUNITY
End: 2020-01-01

## 2020-01-01 RX ORDER — MORPHINE SULFATE 4 MG/ML
1 INJECTION, SOLUTION INTRAMUSCULAR; INTRAVENOUS
Status: DISCONTINUED | OUTPATIENT
Start: 2020-01-01 | End: 2020-06-04

## 2020-01-01 RX ORDER — AMIODARONE HYDROCHLORIDE 200 MG/1
200 TABLET ORAL DAILY
COMMUNITY
End: 2020-01-01

## 2020-01-01 RX ORDER — HALOPERIDOL 5 MG/ML
2 INJECTION INTRAMUSCULAR
Status: DISCONTINUED | OUTPATIENT
Start: 2020-01-01 | End: 2020-06-04

## 2020-01-01 RX ORDER — ASPIRIN 81 MG/1
81 TABLET ORAL DAILY
COMMUNITY

## 2020-01-01 RX ORDER — METOCLOPRAMIDE HYDROCHLORIDE 5 MG/ML
5 INJECTION INTRAMUSCULAR; INTRAVENOUS EVERY 8 HOURS PRN
Status: DISCONTINUED | OUTPATIENT
Start: 2020-01-01 | End: 2020-01-01

## 2020-01-01 RX ORDER — LORAZEPAM 2 MG/ML
1 INJECTION INTRAMUSCULAR EVERY 4 HOURS PRN
Status: DISCONTINUED | OUTPATIENT
Start: 2020-01-01 | End: 2020-06-04

## 2020-01-01 RX ORDER — METOPROLOL SUCCINATE 25 MG/1
25 TABLET, EXTENDED RELEASE ORAL
Status: DISCONTINUED | OUTPATIENT
Start: 2020-01-01 | End: 2020-01-01

## 2020-01-01 RX ORDER — MAGNESIUM OXIDE 400 MG (241.3 MG MAGNESIUM) TABLET
2 TABLET NIGHTLY PRN
Status: DISCONTINUED | OUTPATIENT
Start: 2020-01-01 | End: 2020-01-01

## 2020-01-01 RX ORDER — SODIUM CHLORIDE 9 MG/ML
INJECTION, SOLUTION INTRAVENOUS CONTINUOUS
Status: DISCONTINUED | OUTPATIENT
Start: 2020-01-01 | End: 2020-01-01

## 2020-01-01 RX ORDER — GLYCOPYRROLATE 0.2 MG/ML
0.4 INJECTION, SOLUTION INTRAMUSCULAR; INTRAVENOUS
Status: DISCONTINUED | OUTPATIENT
Start: 2020-01-01 | End: 2020-06-04

## 2020-01-01 RX ORDER — ACETAMINOPHEN 160 MG/5ML
650 SOLUTION ORAL EVERY 6 HOURS PRN
Status: DISCONTINUED | OUTPATIENT
Start: 2020-01-01 | End: 2020-06-04

## 2020-01-01 RX ORDER — METOPROLOL SUCCINATE 25 MG/1
25 TABLET, EXTENDED RELEASE ORAL
Qty: 30 TABLET | Refills: 11 | Status: SHIPPED | OUTPATIENT
Start: 2020-01-01

## 2020-01-01 RX ORDER — ONDANSETRON 4 MG/1
4 TABLET, ORALLY DISINTEGRATING ORAL EVERY 6 HOURS PRN
Status: DISCONTINUED | OUTPATIENT
Start: 2020-01-01 | End: 2020-06-04

## 2020-01-01 RX ORDER — DOCUSATE SODIUM 100 MG/1
100 CAPSULE, LIQUID FILLED ORAL 2 TIMES DAILY
Status: DISCONTINUED | OUTPATIENT
Start: 2020-01-01 | End: 2020-01-01

## 2020-01-01 RX ORDER — MORPHINE SULFATE 4 MG/ML
1 INJECTION, SOLUTION INTRAMUSCULAR; INTRAVENOUS
Status: DISCONTINUED | OUTPATIENT
Start: 2020-01-01 | End: 2020-01-01

## 2020-01-01 RX ORDER — POLYETHYLENE GLYCOL 3350 17 G/17G
17 POWDER, FOR SOLUTION ORAL DAILY PRN
Status: DISCONTINUED | OUTPATIENT
Start: 2020-01-01 | End: 2020-01-01

## 2020-03-15 PROBLEM — N39.0 URINARY TRACT INFECTION WITH HEMATURIA, SITE UNSPECIFIED: Status: ACTIVE | Noted: 2020-01-01

## 2020-03-15 PROBLEM — R55 SYNCOPE, UNSPECIFIED SYNCOPE TYPE: Status: ACTIVE | Noted: 2020-01-01

## 2020-03-15 PROBLEM — R31.9 URINARY TRACT INFECTION WITH HEMATURIA, SITE UNSPECIFIED: Status: ACTIVE | Noted: 2020-01-01

## 2020-03-15 PROBLEM — R55 SYNCOPE: Status: ACTIVE | Noted: 2020-01-01

## 2020-03-15 NOTE — PROGRESS NOTES
03/15/20 1324   Clinical Encounter Type   Routine Visit Follow-up  (POLST document scanned into patient chart)

## 2020-03-15 NOTE — PLAN OF CARE
Pt c/o dizziness when sitting in chair. PCT assisted patient back into bed without any difficulties. RN assisted at bedside and patient felt better in bed. Ginger Ale provided. No new complaints once patient rested. Will continue to monitor.  Vital signs st

## 2020-03-15 NOTE — PLAN OF CARE
Assumed care of patient around 0700. Patient sitting up in bed, oriented to self and situation. Pt unsure of how she got here and cannot remember what happened.  Per night shift RN, pt was attempting to have BM on toilet at assisted living facility and staf

## 2020-03-15 NOTE — H&P
[facility] Hospitalist History and Physical      Patient presents with:  Fall       PCP: Nata De La Rosa MD      History of Present Illness: Patient is a 80year old female with PMH sig for p afib on xarelto, HTN, ho TIA who presented w syncope.       The p (SEE COMMENTS)    Comment:weakness  Darvocet [Propoxyph*    DIARRHEA    Comment:Reported by pt who wants this on her record. Was treated with this post oral surgery.   Fosamax                     Comment:Does not remember reaction it was a long no cyanosis or edema. Skin: Skin color, texture, turgor normal. No rashes or lesions.     Neurologic: Normal strength, no focal deficit appreciated     Data Review:    LABS:   Lab Results   Component Value Date    WBC 12.0 03/15/2020    HGB 12.0 03/15/202

## 2020-03-15 NOTE — PLAN OF CARE
Problem: Patient/Family Goals  Goal: Patient/Family Long Term Goal  Description  Patient's Long Term Goal: go home    Interventions:  - pt  -ot  - cardiac consult  - See additional Care Plan goals for specific interventions   3/15/2020 1835 by Loyde Buerger, As

## 2020-03-15 NOTE — ED NOTES
Pt changed into clean dry brief, pt had large amount of brown stool. Pt straight cath for urine, tolerated well. Pt wrapped in warm blankets for comfort. No distress.

## 2020-03-15 NOTE — PROGRESS NOTES
Pharmacy Note: Renal dose adjustment for Metoclopramide (Reglan)  Bety Duarte has been prescribed Metoclopramide (Reglan) 10 mg every 8 hours as needed. Estimated Creatinine Clearance: 36.4 mL/min (based on SCr of 0.85 mg/dL).     Her calculated cre

## 2020-03-15 NOTE — CONSULTS
[facility] Cardiology Consultation    Sylvieroseannesusan Ocampo Patient Status:  Inpatient    1929 MRN [id number]   [facility] 8NE-A Attending Pearl Porter MD   Hosp Day # 0 PCP Rose Hassan MD     Reason for Consultation:  syncope Mother         stomach   • Other (duodenal carcinoma) Mother    • Cancer Father         melanoma   • Cancer Brother         melanoma and pancreatic   • Other (melanoma) Brother         X4   • Other (colon cancer) Other         M A x3, MU x1         Allergi and dry. Telemetry: neg    Laboratories and Data:  Diagnostics:    EKG, 3/15/2020:  Stable.   PVC    CXR, 3/15/2020:  none    Labs:   HEM:  Recent Labs   Lab 03/15/20  0314   WBC 12.0*   HGB 12.0   .0       Chem:  Recent Labs   Lab 03/15/20

## 2020-03-15 NOTE — ED PROVIDER NOTES
Patient Seen in: [facility] Emergency Department      History   Patient presents with:  Fall    Stated Complaint: syncope, no fall    HPI    Patient is 24-year-old female here for weakness and syncope.   Patient states that she has been very fatigued tenderness. Skin:     General: Skin is warm and dry. Findings: No rash. Neurological:      Mental Status: She is alert and oriented to person, place, and time. Motor: No abnormal muscle tone.       Coordination: Coordination normal.   Psychiat brought back to the examination room and examined immediately due to a high probability of imminent or life-threatening deterioration in the patient's complex condition. The patient was then placed on a cardiac monitor and pulse oximetry was applied.   Anne Marie Milner

## 2020-03-15 NOTE — PROGRESS NOTES
03/15/20 0905 03/15/20 0908 03/15/20 0909   Vital Signs   Pulse 85 84 83   BP 95/47 95/67 100/83   Patient Position Lying Sitting Standing

## 2020-03-16 NOTE — PLAN OF CARE
Problem: Patient/Family Goals  Goal: Patient/Family Long Term Goal  Description  Patient's Long Term Goal: go home    Interventions:  - pt  -ot  - cardiac consult  - See additional Care Plan goals for specific interventions   Outcome: Progressing  Goal: Assist with transfers and ambulation using safe patient handling equipment as needed  - Ensure adequate protection for wounds/incisions during mobilization  - Obtain PT/OT consults as needed  - Instruct patient/family in ordered activity level  Outcome: Pr

## 2020-03-16 NOTE — PHYSICAL THERAPY NOTE
PHYSICAL THERAPY EVALUATION - INPATIENT     Room Number: 6887/1875-U  Evaluation Date: 3/16/2020  Type of Evaluation: Initial  Physician Order: PT Eval and Treat    Presenting Problem: syncope  Reason for Therapy: Mobility Dysfunction and Discharge P • OTHER SURGICAL HISTORY  06/20/2018    Cysto - Dr Addie Rios   • TONSILLECTOMY     • TOTAL HIP REPLACEMENT       left  7/07   , right 6/09       HOME SITUATION  Type of Home: Assisted living facility   Home Layout: Multi-level  Stairs to Enter : 0 the patient currently need. ..   -   Moving to and from a bed to a chair (including a wheelchair)?: A Little   -   Need to walk in hospital room?: A Little   -   Climbing 3-5 steps with a railing?: A Little       AM-PAC Score:  Raw Score: 20   Approx Degree mobility prior to return home. Based on this evaluation, patient's clinical presentation is stable and overall the evaluation complexity is considered low.   These impairments and comorbidities manifest themselves as functional limitations in independen

## 2020-03-16 NOTE — PROGRESS NOTES
[facility] Cardiology  Progress Note    Chris Tucker Patient Status:  Inpatient    1929 MRN [id number]   [facility] 8NE-A Attending Fatou Arias MD   Hosp Day # 1 PCP Valeriy Galicia MD     Subjective:   Up oral surgery. Fosamax                     Comment:Does not remember reaction it was a long time ago.   Macrobid [employer]*    OTHER (SEE COMMENTS)    Comment:Reports severe abd cramping and fatigue    Physical Exam:   General:  Jessica Fox / Terrie Sewell event   -? Vagal on toilet   -? Arrythmia - no complex arrhythmia to date to account for episode  2. PAF   -Now SR on Amio   -On Xarelto  3.  +Troponin   -Doubt ACS.   Suspect reflects syncopal / hypoperfusive event   -No Sx of ischemia   -Elevated in the pa

## 2020-03-16 NOTE — CM/SW NOTE
COND 44: Patient failed Inpatient criteria. Second level of review completed and supports Observation. UR committee in agreement. Discussed with Dr Endy Rivera approves.

## 2020-03-16 NOTE — OCCUPATIONAL THERAPY NOTE
OCCUPATIONAL THERAPY EVALUATION - INPATIENT     Room Number: 0534/1797-C  Evaluation Date: 3/16/2020  Type of Evaluation: Initial  Presenting Problem: UTI, syncope    Physician Order: IP Consult to Occupational Therapy  Reason for Therapy: ADL/IADL Dysfunc hips   • HYSTERECTOMY      11/06 vag hysterectomy with sling   • OTHER SURGICAL HISTORY  06/20/2018    Cysto - Dr Graf Lyn   • TONSILLECTOMY     • TOTAL HIP REPLACEMENT       left  7/07   , right 6/09       OCCUPATIONAL PROFILE    HOME SITUATION  Type of Home: ACTIVITIES OF DAILY LIVING ASSESSMENT  AM-PAC ‘6-Clicks’ Inpatient Daily Activity Short Form  How much help from another person does the patient currently need…  -   Putting on and taking off regular lower body clothing?: A Little  -   Bathing (eribertoi problem-focused assessments, limited treatment options    Overall Complexity LOW     OT Discharge Recommendations: Home(return to assisted living)       PLAN  OT Treatment Plan: Balance activities; Energy conservation/work simplification techniques;ADL darnell

## 2020-03-16 NOTE — PROGRESS NOTES
[facility] Hospitalist Progress Note                                                                   [address] [place] [address],[address]  4/22/1929    SUBJECTIVE:  No acute events. BP stable.   She feels well, more BB.  amio continued by cardiology. # possible UTI  - fu UC  - IV rocephin     # chronic elevated troponin  # p afib  - appreciate cards eval  - cont xarelto       Dispo;   Watch today, likely dc tomorrow     DNR       [facility] Hospitalist  P

## 2020-03-16 NOTE — PLAN OF CARE
Patient alert and oriented x3, disoriented to situation, forgetful and repeating herself at times. Vital signs stable, normal sinus rhythm on telemetry. Patient up to the chair.  Orthostatic positive, BP lying 110/61, sitting 110/53, standing 84/60, asympto arrhythmias  - Monitor electrolytes and administer replacement therapy as ordered  Outcome: Progressing     Problem: Neurological  Goal: Achieves stable or improved neurological status  Description  INTERVENTIONS:  - Assess for and report changes in neurol

## 2020-03-16 NOTE — CM/SW NOTE
MSW called [facility] on [phone] and confrimed patient is an Assisted Living resident.

## 2020-03-17 NOTE — PHYSICAL THERAPY NOTE
PHYSICAL THERAPY TREATMENT NOTE - INPATIENT    Room Number: 1263/3694-N     Session: 1   Number of Visits to Meet Established Goals: 2     History related to current admission: Pt admitted from [facility] on 3/15/20  with syncopal episode while on toilet.   Pt wi REPLACEMENT       left  7/07   , right 6/09       SUBJECTIVE  Patient pleasant and cooperative poor recall noted during session    Patient’s self-stated goal is 'Walk outside\"    OBJECTIVE  Precautions: Bed/chair alarm    WEIGHT BEARING RESTRICTION  Weigh Patient performed seated and standing exercises.  Patient encouraged to perform exercises during the day and ambulate on unit with assist PCT aware of current level of assist.     THERAPEUTIC EXERCISES  Lower Extremity Alternating marching  Ankle pumps  Hip

## 2020-03-17 NOTE — PLAN OF CARE
Patient alert and oriented to self, place, and situation, disoriented to time, forgetful. Vital signs stable, orthostatic blood pressures negative. Patient was up to the chair. Denies any pain. Jolon Master 9 normal saline infusing at 50 cc/hr as ordered.  Medications Progressing  Goal: Absence of cardiac arrhythmias or at baseline  Description  INTERVENTIONS:  - Continuous cardiac monitoring, monitor vital signs, obtain 12 lead EKG if indicated  - Evaluate effectiveness of antiarrhythmic and heart rate control medicati with assist on unit 3-4x/day   Outcome: Progressing

## 2020-03-17 NOTE — PROGRESS NOTES
03/17/20 0315   Provider Notification   Reason for Communication Change in status  (afib rvr)   Provider Name   (Dr. Constantine Randall)   Method of Communication Page   Response Phone call  (restart home med metoprolol tartrate 25 mg bid.)   Notification Time 031

## 2020-03-17 NOTE — PROGRESS NOTES
[facility] Cardiology  Progress Note    Carine Comp Patient Status:  Inpatient    1929 MRN [id number]   [facility] 8NE-A Attending Phong Nur MD   Hosp Day # 1 PCP Yolie Alfaro MD     Subjective:   Up [Propoxyph*    DIARRHEA    Comment:Reported by pt who wants this on her record. Was treated with this post oral surgery. Fosamax                     Comment:Does not remember reaction it was a long time ago.   [employer]*    OTHER (SEE 0.050*         Tele: PAF. Now AF with elevated rate. Assessment:    1. Syncope   -Dementia limits Hx and insight into nature of event   -? Vagal on toilet   -? Arrythmia - no complex arrhythmia to date to account for episode  2.   PAF   -Now AF despi

## 2020-03-17 NOTE — DISCHARGE SUMMARY
General Medicine Discharge Summary     Patient ID:  Lani Ear  80year old  4/22/1929    Admit date: 3/15/2020    Discharge date and time: 3/18/2020    Attending Physician: Yeimy Benjamin MOUTH DAILY WITH FOOD    LONGS ADULT LOW STRENGTH ASA 81 MG OR TBEC  1 TABLET DAILY    Fluocinonide 0.05 % External Ointment  Apply to left hand and right forehead twice daily    acetaminophen 325 MG Oral Tab  Take 325 mg by mouth every 6 (six) hours as ne

## 2020-03-18 NOTE — PROGRESS NOTES
[facility] Hospitalist Progress Note                                                                   [address] [place] [address],[address]  4/22/1929    SUBJECTIVE: per RN has been without complaints.       When I wal Syncope, unspecified syncope type  Active Problems:    Syncope    Urinary tract infection with hematuria, site unspecified    # suspected paranoid delusions  # ho dementia, on namenda  - given delusions will ask Dr Iliana Villasenor to see.   Monitor overnight.

## 2020-03-18 NOTE — PLAN OF CARE
Assumed pt care at [address]. A&Ox4, with intermittent confusion/forgetfulness. RA, denies pain/SOB, lung sounds clear/diminished, VSS, NSR on tele. Incontinent/briefed. Up with 1 and walker.  POC is IV rocephin q 24, IVF, orthos daily, back to sunrise in AM. PO

## 2020-03-18 NOTE — PROGRESS NOTES
[facility] Cardiology  Progress Note    Pratibha Mederos Patient Status:  Inpatient    1929 MRN [id number]   [facility] 8NE-A Attending Mervat Casper MD   Hosp Day # 1 PCP Gloria Aviles MD     Subjective:   Up this on her record. Was treated with this post oral surgery. Fosamax                     Comment:Does not remember reaction it was a long time ago.   Macrobid [Nitrofura*    OTHER (SEE COMMENTS)    Comment:Reports severe abd cramping and fatigu Assessment:    1. Syncope   -Dementia limits Hx and insight into nature of event   -? Vagal on toilet   -? Arrythmia - no complex arrhythmia to date to account for episode  2. PAF   -Now AF despite Amio   -On Xarelto  3.  +Troponin   -Doubt ACS.   Baylee

## 2020-03-18 NOTE — OCCUPATIONAL THERAPY NOTE
Attempted to see the pt for OT session. Per RN and MD, pt is presenting with altered mental status (thinks someone came to her room last night). Will continue to follow.   Spoke with MD.

## 2020-03-18 NOTE — PLAN OF CARE
Pt is A/O x 4. Cleared for discharge. Son to ., Report called to sunrise. Pt only briefly went into AF 's, but did not sustain. Saline lock d/c'd.

## 2020-03-18 NOTE — PROGRESS NOTES
[facility] Hospitalist Progress Note                                                                   [address] [facility],[address]  4/22/1929    SUBJECTIVE:  Feels well. Denies dizziness. No fevers.       OBJ confirmed w son, she was NO LONGER taking BB.  amio continued by cardiology.    - restart BB at lower dose per cards     # possible UTI  - fu UC- now growth.   Dc abx     # chronic elevated troponin  # p afib  - appreciate cards eval  - cont xarelto     Leona Covert

## 2020-06-02 PROBLEM — S02.0XXA CLOSED FRACTURE OF PARIETAL BONE, INITIAL ENCOUNTER (HCC): Status: ACTIVE | Noted: 2020-01-01

## 2020-06-02 PROBLEM — I62.9 NONTRAUMATIC INTRACRANIAL HEMORRHAGE (HCC): Status: ACTIVE | Noted: 2020-01-01

## 2020-06-02 PROBLEM — Z79.01 ANTICOAGULATED BY ANTICOAGULATION TREATMENT: Status: ACTIVE | Noted: 2020-01-01

## 2020-06-02 PROBLEM — S72.024A: Status: ACTIVE | Noted: 2020-01-01

## 2020-06-02 PROBLEM — I62.9 INTRACRANIAL HEMORRHAGE (HCC): Status: ACTIVE | Noted: 2020-01-01

## 2020-06-02 NOTE — ED NOTES
Patient requested for patient to be removed from BP cuff and would like comfort care. Patient requesting to speak to Dr. Marvin Cowart to discuss.

## 2020-06-02 NOTE — PALLIATIVE CARE NOTE
Palliative service received consult for comfort care and PS page from [address] [place] [address],[address]. D/w Selene GONZALEZ who advised re pt's condition, noting pt is EOL/imminent. Comfort orders per hospitalist reviewed w adequate relief of sx, no signs of discomfort.     I called p

## 2020-06-02 NOTE — PROGRESS NOTES
06/02/20 1653   Clinical Encounter Type   Visited With Health care provider   Routine Visit Follow-up  ( responded to a S/C request. The RN reported that the pt is not responsive and that an earlier  had provided the S/C needed.   RN was

## 2020-06-02 NOTE — ED INITIAL ASSESSMENT (HPI)
Pt presents to ed from [facility]. Pt tranported to ED per [facility]. Per EMS report pt was to norm prior to fall per NH staff. Pt was ambulating to bathroom by self pt had unwitnessed fall and ams after fall.  Pt has c-collar upon arrival. Pt hold

## 2020-06-02 NOTE — ED NOTES
Patients son Magdaline Nail at bedside. Dr. Trude Gottron at bedside discussing results and POC with Magdaline Nail.

## 2020-06-02 NOTE — PLAN OF CARE
Pt admitted from ED with comfort care order set. Unresponsive to stimuli. RA, ST, VSS. No tele. DNR. Snoring on and off. Saline locked. Son, Oly Mike here and approved to come to patient's bedside. Will be escorted to room and instructed to wear proper PPE.

## 2020-06-02 NOTE — CONSULTS
[facility]  Neurosurgery Consult    [facility] Patient Status:  Emergency    1929 MRN [id number]   Location [address] [facility] Attending Gabriella Kaminski MD   Hosp Day # 0 PCP Shawna Anguiano MD     [address] Osteoarthritis    • Osteoporosis     fosamax 2/02-11/02,  evista 11/02 to '08, micalcin 08-present   • Other specified transient cerebral ischemias     7/07: MRI small vessel white matter ischemia   • Prediabetes    • Recurrent UTI     macrobid suppression Intravenous, Continuous  levETIRAcetam (KEPPRA) 500 mg in sodium chloride 0.9% 100 mL IVPB, 500 mg, Intravenous, Once        REVIEW OF SYSTEMS:  A 10-point system was reviewed. Pertinent positives and negatives are noted in HPI.       PHYSICAL EXAMINATION: There is no midline shift. Chronic small vessel ischemic change within the deep white matter again noted. SINUSES:           No sign of acute sinusitis. MASTOIDS:          No sign of acute inflammation.      SKULL:             Minimally displ DDD    Plan:  Discussed with Dr. Luiza Perez  No acute surgical intervention  Hold anticoagulation and antiplatelets until cleared by neurosurgery  Admit to CNICU  Q 1 hr neuro checks  [place] collar ordered for possible cervical fx, keep on at all times  Keppra

## 2020-06-02 NOTE — ED PROVIDER NOTES
Patient Seen in: [facility] Emergency Department      History   Patient presents with:  Trauma 1 & 2    Stated Complaint: Unwitnessed fall    HPI    Ate breakfast at nursing home in [facility] then walked to bathroom unwitnessed fall associated with ba hysterectomy with sling   • OTHER SURGICAL HISTORY  06/20/2018    Cysto - Dr Patricia Robles   • TONSILLECTOMY     • TOTAL HIP REPLACEMENT       left  7/07   , right 6/09                    Social History    Tobacco Use      Smoking status: Never Smoker      Smokeless gross deformities noted   Skin:     General: Skin is warm. Capillary Refill: Capillary refill takes less than 2 seconds. Neurological:      Mental Status: She is disoriented. Cranial Nerves: No cranial nerve deficit. Motor: No weakness. depression is new no PVCs seen           Radiology called me immediately with the findings of patient's CT of the brain I therefore immediately called the patient's son.   I asked him to come to the emergency department, patient remains DNR at this time, we parietal lobes, the right measuring 4 x 2.7     x2.8, AP x T x cc dimension respectively and on the left measuring 2.8 x     3.7 x 3.7 cm. Left occipital lobe     hematoma measures 3.9 x 3.7 x 3.1 cm. Surrounding vasogenic edema.       Hemorrhage within t BONES:  Sagittal images demonstrate slight reversal of the cervical     lordosis centered at C4 with multilevel slight subluxation most likely     degenerative in nature.   Vertebral body height maintained with multiple     cystic foci consistent with degen so that he can both assess the situation, discussed plan of care and probably say good-bye to his mother  Admission disposition: 6/2/2020  9:42 AM           Total critical care time exclusive of procedure time on this patient was 35 minutes included multip

## 2020-06-02 NOTE — ED NOTES
Upon Rn assessment of patient, posturing noted to upper extremities- decorbicut. Rn placed patients HOB 30 degrees.

## 2020-06-02 NOTE — CONSULTS
[facility]  Neurocritical Care Consult Note    Edin Malick Patient Status:  Emergency    1929 MRN [id number]   Location [address] [facility] Attending Roque Craias MD   Hosp Day # 0 PCP [place] - Dr Karrie Hussein   • TONSILLECTOMY     • TOTAL HIP REPLACEMENT       left  7/07   , right 6/09       (Not in a hospital admission)      Alendronic Acid         OTHER (SEE COMMENTS)    Comment:Does not remember reaction it was a long time ago.   Ciprofloxacin Denies changes in vision or difficulty swallowing. Pulm:                    Denies dyspnea, cough, or sputum production  Cardiac:               Denies chest pain, palpitations or lower extremity edema.   GI:                         Denies constipation, and/or prevent neurologic instability. This involved direct patient intervention, complex decision making, and/or extensive discussions with the patient, family, and clinical staff. Thank you for allowing me to participate in the care of this patient.

## 2020-06-02 NOTE — ED NOTES
Pt drowsy, pt not responding to verbal or painful stimuli. Pt moving left arm. Dr. Arnulfo Hartley aware of change in patients assessment.

## 2020-06-02 NOTE — ED NOTES
at bedside. Pt is DNR. RN informed Dr. Davida Santiago of patients BP. Rn stopped Cardene at this time. Rn continuing to monitor pt.

## 2020-06-02 NOTE — H&P
[facility] hospitalist H+P  PCP Daniel Zapien MD  CC fall  HPI 79 yo female with multiple medical problems including but not limited to HTN, SIADH, arrhtymia on anticoagulation, left foot drop  came from SNF, had unwitnessed fall, noted to have altered m Cancer Mother         stomach   • Other (duodenal carcinoma) Mother    • Cancer Father         melanoma   • Cancer Brother         melanoma and pancreatic   • Other (melanoma) Brother         X2   • Other (colon cancer) Other         M A x3, MU x1     Soci limited    All   Alendronic Acid         OTHER (SEE COMMENTS)    Comment:Does not remember reaction it was a long time ago.   Ciprofloxacin           FATIGUE, RESTLESSNESS  Bactrim [Sulfametho*    DIZZINESS, OTHER (SEE COMMENTS)    Comment:weakness  Darvoce radial pulses    Labs  Recent Labs   Lab 06/02/20  0830   RBC 4.28   HGB 12.2   HCT 37.9   MCV 88.6   MCH 28.5   MCHC 32.2   RDW 14.7   NEPRELIM 6.55   WBC 9.7   .0     Rapid sars cov 2 not detected    Recent Labs   Lab 06/02/20  0830   PTP 14.5   I

## 2020-06-02 NOTE — PROGRESS NOTES
06/02/20 1031   Clinical Encounter Type   Visited With Patient   Patient's Supportive Strategies/Resources Background: Anabaptist   Patient Spiritual Encounters   Spiritual Interventions  provided prayer with patient's son.    remains avai

## 2020-06-02 NOTE — ED NOTES
Pt not answering questions. Pt continues to say \"Please help me. \" Pt moving all extremities. Pt not opening eyes. Upon Rn assessment  Of eyes pt is squeezing eyes closed . Rn placed patient on cardiac monitor and pulse ox.

## 2020-06-02 NOTE — ED NOTES
Pt responds to pain. Pt moving left arm spontaneously. Pts eyes open. Pt not following commands. Rn continuing to monitor patient. Rn at bedside.

## 2020-06-02 NOTE — ED NOTES
Dr. Angelica Rodriguez received ct results of head. CT + bleeding to brain. Dr. Angelica Rodriguez called and spoke to patients son Yeni Batres and informed son of results and POC.

## 2020-06-02 NOTE — PROGRESS NOTES
06/02/20 1154   Clinical Encounter Type   Visited With Patient and family together   Continue Visiting No   Patient's Supportive Strategies/Resources [employer] in [place].     Patient Spiritual Encounters   Spiritual Needs  provided

## 2020-06-02 NOTE — ED NOTES
Patients family requested for an IV be removed for patients comfort. Rn dc'd rt hand IV site. No change in patient assessment. Pt placed on end of life care.

## 2020-06-03 NOTE — DISCHARGE SUMMARY
[facility] Internal Medicine Discharge Summary   Patient ID:  Anni Lindo  [id number]  76 year old  4/22/1929    Admit date: 6/2/2020    Discharge date and time: 6/2/2020     Attending Physician: No att. providers found     Primary Care Physician: [facility]. Per Neurosurgery Definitely a crack in the right lateral mass of C2. Family decided to do hospice for the patient.  Pt is DNR/DNI per the family and their goal is comfort for the patient    Discharge meds family decided to do hospice parietal comminuted skull fracture. 2. Critical findings discussed with the ED MD, Dr. Annabel Zeng at approximately 0900 hours on 6/2/2020. Read back performed.   Dictated by: Huyen Ruelas MD on 6/02/2020 at 8:59 AM     Finalized by: Huyen Ruelas MD on 6/02/2020 a brain also performed with acute hemorrhage present.    Dictated by: Car Randall MD on 6/02/2020 at 9:27 AM     Finalized by: Car Randall MD on 6/02/2020 at 9:34 AM          Xr Hip W Or Wo Pelvis 2 Or 3 Views, Right (cpt=[id number])    Result Date: 6/2/2020  PROCED

## 2020-06-03 NOTE — H&P
[facility] Hospitalist History and Physical      CC:  Inpatient hospice admission      PCP: Lamin Castellanos MD      History of Present Illness: Patient is a 80year old female with PMH sig for hx of paroxysmal A-fib on Anticoagulation, hx of syncope presen Bilateral     bilateral hips   • HYSTERECTOMY      11/06 vag hysterectomy with sling   • OTHER SURGICAL HISTORY  06/20/2018    Cysto - Dr Patricia Robles   • TONSILLECTOMY     • TOTAL HIP REPLACEMENT       left  7/07   , right 6/09        ALL:    Alendronic Acid Other (duodenal carcinoma) Mother    • Cancer Father         melanoma   • Cancer Brother         melanoma and pancreatic   • Other (melanoma) Brother         X2   • Other (colon cancer) Other         M A x3, MU x1       Review of Systems  Comprehensive ROS Surrounding vasogenic edema. Hemorrhage within the cortical sulci consistent with subarachnoid component. There is no midline shift. Chronic small vessel ischemic change within the deep white matter again noted.    SINUSES:           No sign of acute sin level consistent with degenerative disc disease. Multilevel osteophyte disc complexes with bilateral neural foraminal narrowing. Facet joint arthropathy, left greater than right.   Coronal images demonstrate curvature of the cervical spine, convex to the Finalized by: Toño Veronica MD on 6/02/2020 at 9:56 AM             Assessment/Plan:     80 yr old female with PMH sig for hx of paroxysmal A-fib on Anticoagulation, hx of syncope presented 6/2/2020 after unwitnessed fall, was diagnosed with ICH, left po

## 2020-06-03 NOTE — PROGRESS NOTES
06/03/20 1726   Clinical Encounter Type   Visited With Health care provider  ( spoke to nurse via telephone and stated to the  the patient is non-verbal and is dying.   Patient is hospice. )   Routine Visit Follow-up   Continue Visiting N

## 2020-06-03 NOTE — PLAN OF CARE
PT received unresponsive tonight. PT is DNR, comfort care. On RA, HR tachycardic when taken radially, RR elevated, PT does not appear to be in pain when at rest in bed, there is no vocalization, pain assumed to be present.  Medicated with morphine and ativa

## 2020-06-03 NOTE — PLAN OF CARE
Pt non responsive, even to pain. On ra, o2 sats sustaining in 30s at this time. Son Kin Rosales called, aware of situation, will try to come see pt. Hospice rn came & rounded on pt this am. Pt brief changed & bathed. Morphine gtt infusing per ordered.  Will contin

## 2020-06-03 NOTE — HOSPICE RN NOTE
Met with 3 sons at bedside. Valdo Marks [facility]) signed all consents for hospice after explanation and benefit. Also that GIP level would  entail working closely with nursing staff to ensure pts comfort from pain, sob or anxiety.  That is their goal of care

## 2020-06-03 NOTE — HOSPICE RN NOTE
LUDWIN walker.2. Pt unresponsive with RR 18, unlabored, clear on MS drip 2mg./h.  She has had prn lorazepam for comfort and IVP MS was changed to drip overnight to maintain reduction in pain which was evidenced by grimacing and moaning with movement overnight.   Kimmy Sewell

## 2020-06-04 NOTE — PROGRESS NOTES
Patient passed away at [address]. Verified with second RN Thompson Gastelum. Son Teresa Dixon notified, as well as hospice, spiritual care, nursing supervisor and . Gift of hope contact, pt is not candidate for donation. Wrist band from [facility] is in bag.

## 2020-06-04 NOTE — PROGRESS NOTES
06/03/20 8461   Clinical Encounter Type   Continue Visiting   (Per nurse, this will be a 's case due to the fact that the patient fell.)

## 2020-06-04 NOTE — PROGRESS NOTES
20   Clinical Encounter Type   Visited With Health care provider;Family  ( spoke to son via telephine and offered condolences.   Nurse provided  home information.)   Routine Visit Follow-up   Continue Visiting No   Crisis Visit

## 2020-06-04 NOTE — DISCHARGE SUMMARY
General Medicine Discharge Summary     Patient ID:  Malena Askew  80year old  4/22/1929    Admit date: 6/2/2020    Discharge date and time: 6/3/2020  7:30 PM     Attending Physician: Triny Melendez

## (undated) DEVICE — ENDOSCOPY PACK - LOWER: Brand: MEDLINE INDUSTRIES, INC.

## (undated) DEVICE — Device: Brand: DEFENDO AIR/WATER/SUCTION AND BIOPSY VALVE

## (undated) DEVICE — 1200CC GUARDIAN II: Brand: GUARDIAN

## (undated) DEVICE — 3M™ RED DOT™ MONITORING ELECTRODE WITH FOAM TAPE AND STICKY GEL, 50/BAG, 20/CASE, 72/PLT 2570: Brand: RED DOT™

## (undated) DEVICE — FILTERLINE NASAL ADULT O2/CO2

## (undated) NOTE — IP AVS SNAPSHOT
Patient Demographics     Address Phone E-mail Address    [address] [phone] (Home) [email or web address]      Emergency Contact(s)     Name Relation Home Work Isra Power [phone]      Farzaneh Blas [phone] Next dose due:  TONIGHT        Take 2 mg by mouth nightly.                             Oseltamivir Phosphate 75 MG Caps   Last time this was given:  75 mg on 3/26/2017  8:17 AM   Commonly known as:  TAMIFLU   Next dose due:  TONIGHT        Take 1 capsule (7 MRSA Culture Only Once [[id number]] Collected:  03/23/17 1410    Order Status:  Completed Lab Status:  Final result Updated:  03/25/17 0809    Specimen Information:  Other from Nares      Mrsa Culture No MRSA Isolated     Narrative:      Note: This culture H&P by Reyes Mosley MD at 3/23/2017 12:47 PM  Version 1 of 1    Author:  Reyes Mosley MD Service:  (none) Author Type:  Physician    Filed:  3/23/2017  1:58 PM Note Time:  3/23/2017 12:47 PM Status:  Signed    :  Reyes Mosley MD (Physician) Comment 11/06 vag hysterectomy with sling    TOTAL HIP REPLACEMENT      Comment left  7/07   , right 6/09    TONSILLECTOMY      HIP REPLACEMENT SURGERY Bilateral     Comment bilateral hips       Social History:  reports that she has never smoked.  She has Probiotic Product (ALIGN OR) Take  by mouth. Disp:  Rfl:    LONGS ADULT LOW STRENGTH ASA 81 MG OR TBEC 1 TABLET DAILY Disp:  Rfl:    FISH OIL 1 DAILY Disp:  Rfl:    CALCIUM + D OR Take 2 Tabs by mouth daily.  Disp:  Rfl:        Review of Systems:   FALGUNI bañuelos 2. Bradycardia -hold beta-blocker. [address] cardiology consultation  3. A. fib -monitor off antiarrhythmic medications continue Xarelto  4. Osteoporosis  5. Hyponatremia -IV fluid repeat bmp tomorrow  6.  Coagulopathy -elevated INR is due to Xarelto, not • Impaired fasting glucose      highest= 112 (9/07)     • Other specified transient cerebral ischemias      7/07: MRI small vessel white matter ischemia   • SIADH (syndrome of inappropriate ADH production) ([facility].)      7/07: SIADH s/p THR, rx demeclocycline Current Outpatient Prescriptions on File Prior to Encounter:  amiodarone HCl 200 MG Oral Tab Take 1 tablet (200 mg total) by mouth 2 (two) times daily.  200 mg BID for one month then reduce to 1 tablet daily Disp: 60 tablet Rfl: 0   Rivaroxaban 20 MG Oral T Neck: Supple. No JVD. Carotids 2+ and equal in symmetric fashion. No bruits are noted. Cardiac: Regular rate and rhythm. There is a normal S1 and S2. No S3 or S4. No murmurs, rubs, or gallops. PMI is non-displaced with a normal apical impulse.   Addie Le    Systolic function was normal. Systolic pressure was mildly increased. RV     systolic pressure (S, est): 32mm Hg. 3. Left atrium: The left atrial volume was mildly increased. 4. Mitral valve: Mildly calcified annulus. There was mild regurgitation.   5. History related to current admission: pt admitted from [facility] due to cough, atr fib with rvr. Pt diagnosed with influenza B. Pt just discharged from [facility] 3/17/17, at that time also has UTI. See below for PMH. Dementia.      Problem List  Principal meals, but reports she has more recently been using wc since last admit. Pt reports assist with adls.      SUBJECTIVE  \"I dont know why I had to come back\"    Patient self-stated goal is to feel stronger    OBJECTIVE  Precautions: Cardiac  Fall Risk: Hig How much help from another person does the patient currently need. ..   -   Moving to and from a bed to a chair (including a wheelchair)?: A Lot   -   Need to walk in hospital room?: A Lot   -   Climbing 3-5 steps with a railing?: Total       AM-PAC Score: manifest themselves as functional limitations in safe transfers and ambulation. Pt demonstrating 68.66% degree of impairment per AM PAC.    The patient is below her baseline and would benefit from skilled inpatient PT to address the above deficits to assist Prolia Im Inj, Up To 60 Mg 05/06/15     Prolia Im Inj, Up To 60 Mg 10/28/14     Zoster (Shingles) 10/01/09       Future Appointments        Provider Meggan Luna    4/4/2017 1:40 PM SANTO Subramanian  CARDIOLOGY [address] [place] [address]    8/15/2017 2:00 PM Jamel Monas,

## (undated) NOTE — ED AVS SNAPSHOT
[facility] Emergency Department    [place]  [address] [address]    Phone:  [phone]    Fax:  [address] [facility]   MRN: [id number]    Department:  [facility] Emergency Department   Date of Visit:  6/1 CYST, ROJAS'S (ENGLISH)    ARTHRALGIA (ENGLISH)      Disclosure     Insurance plans vary and the physician(s) referred by the ER may not be covered by your plan. Please contact your insurance company to determine coverage for follow-up care and referrals. prescription right away and begin taking the medication(s) as directed    If the emergency physician has read X-rays, these will be re-interpreted by a radiologist.  If there is a significant change in your reading, you will be contacted.  Please make sure Medicaid plans. To get signed up and covered, please call [phone] and ask to get set up for an insurance coverage that is in-network with [facility].         Imaging Results         US VENOUS DOPPLER LEG RIGHT - DIAG IMG (FFN=[id number]) (Fin If you've recently had a stay at the Hospital you can access your discharge instructions in [employer] by going to Visits < Admission Summaries.  If you've been to the Emergency Department or your doctor's office, you can view your past visit information in My

## (undated) NOTE — IP AVS SNAPSHOT
[facility] [place] [address] [place], [address] ~ [phone]                Discharge Summary   3/23/2017    [facility]           Admission Information        Department    3/23/2017  8ne-A         Thank you for choosing [facility] Next dose due:  TOMORROW        Take 1 tablet (15 mg total) by mouth daily with food.     Ramakrishna Vu taking these medications        Instructions Authorizing Provider    Morning Afternoon Evening As Needed    ALIGN OR [address] [place] [address]   [phone]            Aug 15, 2017  2:00 PM   FOLLOW UP with Brigida Chang MD   [facility])    [facility] [address]  [address] [address] 0.54 (03/14/17)  0.01 (03/14/17)  0.31      Metabolic Lab Results  (Last result in the past 90 days)    HgbA1C Glucose BUN Creatinine Calcium Alkaline Phosph AST    -- (03/24/17)  86 (03/24/17)  17 (03/24/17)  0.69 (03/24/17)  8.4 (03/23/17)  87 (03/23/17) office, you can view your past visit information in [facility] [employer] by going to Visits < Visit Summaries. [facility] questions? Call [phone] for help. [facility] [employer] is NOT to be used for urgent needs.   For medical emergencies, dial 911.             _____________ Most common side effects: Stomach upset   What to report to your healthcare team: Stomach upset, unresolved pain           GI Medications     Probiotic Product (ALIGN OR)       Use:  Nausea/vomiting, acid reflux, low bowel motility, stomach pain   Most com

## (undated) NOTE — ED AVS SNAPSHOT
Jennifer Rich   MRN: [id number]    Department:  [facility] Emergency Department   Date of Visit:  3/17/2018           Disclosure     Insurance plans vary and the physician(s) referred by the ER may not be covered by your plan.  Please contact yo tell this physician (or your personal doctor if your instructions are to return to your personal doctor) about any new or lasting problems. The primary care or specialist physician will see patients referred from the [facility] Emergency Department.  Jay Rhodes

## (undated) NOTE — ED AVS SNAPSHOT
Rashaad Vences   MRN: [id number]    Department:  [facility] Emergency Department   Date of Visit:  7/29/2017           Disclosure     Insurance plans vary and the physician(s) referred by the ER may not be covered by your plan.  Please contact yo If you have been prescribed any medication(s), please fill your prescription right away and begin taking the medication(s) as directed    If the emergency physician has read X-rays, these will be re-interpreted by a radiologist.  If there is a significant

## (undated) NOTE — IP AVS SNAPSHOT
Patient Demographics     Address  [address] [place] [address] Phone  [phone] [facility])  [phone] (Mobile) *Preferred* E-mail Address  [email or web address]      Emergency Contact(s)     Name Relation Home Work [phone] [facility] Son [phone] Next dose due:  3/18 morning       Take 2,000 Units by mouth daily.           Fluocinonide 0.05 % Oint  Commonly known as:  LIDEX  Next dose due:  3/18 morning       Apply to left hand and right forehead twice daily   MD Aide Pimentel Andrzej SpO2  97 % Filed at 03/18/2020 1800      Patient's Most Recent Weight       Most Recent Value   Patient Weight  51.9 kg (114 lb 6.4 oz)      Lab Results Last 24 Hours    No matching results found     Microbiology Results (All)     Procedure Component Value • Other specified transient cerebral ischemias     7/07: MRI small vessel white matter ischemia   • Prediabetes    • Recurrent UTI     macrobid suppression 12/09--2010   • SIADH (syndrome of inappropriate ADH production) ([facility].)     7/07: SIADH s/p THR, rx d /83 (BP Location: Right arm)   Pulse 83   Temp 98.5 °F (36.9 °C) (Oral)   Resp 20   Wt 114 lb 6.4 oz (51.9 kg)   SpO2 98%   BMI 20.27 kg/m²[JS.2]   General:  Alert, no distress, appears stated age.     Head:  Normocephalic, without obvious abnormality - will have cards see given syncope and lower BP on amio and BB which are currently held  - cont xashannonto      DNR    Outpatient records or previous hospital records reviewed.    [facility] hospitalist to continue to follow patient while in house  A total of 75  mi event on the toillet and brought in early this morning. She says she is tired but denies any complaints. Tele is negative. She has PAF, currently in NSR.     History:[SL.1]  Past Medical History:   Diagnosis Date   • Arrhythmia    • Arthritis    • Cancer Ciprofloxacin           FATIGUE, RESTLESSNESS  Bactrim [Sulfametho*    DIZZINESS, OTHER (SEE COMMENTS)    Comment:weakness  Darvocet [Propoxyph*    DIARRHEA    Comment:Reported by pt who wants this on her record.              Was treated with this post oral CREATSERUM 0.85   CA 8.2*   MG 2.0   *       Recent Labs   Lab 03/15/20  0314   ALT 23   AST 18   ALB 3.0*       No results for input(s): PTP, INR in the last 168 hours.     Recent Labs   Lab 03/15/20  0314 03/15/20  0832   TROP 0.048* 0.050*[SL.2] team.  For all other patients, please follow usual protocol for discharge care transition.     Secondary Diagnoses: None    Risk of readmission:[JS.1] Yamileth Parr[JS.2] has Moderate Risk of readmission after discharge from the hospital.    Discharge Co Diet: cardiac diet  Wound Care: as directed  Code Status:[JS.1] DNR[JS.2]      Exam on day of discharge:[JS.1]      03/17/20  0751   BP: 108/62   Pulse: 73   Resp: 18   Temp:[JS.2]        General: no acute distress, alert and oriented x 3  Heart: RRR  Lung Secondary Diagnoses: None    Risk of readmission:[JS.1] Papa Parr[JS.2] has Moderate Risk of readmission after discharge from the hospital.    Discharge Condition: stable    Disposition: home    Important Follow up:  - PCP within   - Consults:[JS.1] Exam on day of discharge:[JS.1]      03/17/20  0751   BP: 108/62   Pulse: 73   Resp: 18   Temp:[JS.2]        General: no acute distress, alert and oriented x 3  Heart: RRR  Lungs: clear bilaterally, no active wheezing  Abdomen: nontender, nondistended, int • Compression fx, lumbar spine (HCC)     T12 and L2 comp fx. 12/09 (? age)   • Elevated blood pressure    • Endocrine disorder    • Esophagitis    • Essential hypertension    • Foot drop     left foot drop s/p THR  (7/707)   • Hearing impairment     Hearin blankets)?: None   -   Sitting down on and standing up from a chair with arms (e.g., wheelchair, bedside commode, etc.): A Little   -   Moving from lying on back to sitting on the side of the bed?: None   How much help from another person does the patient services. Will continue to follow for restorative care to maintain current level of mobility to facilitate return to AL.     DISCHARGE RECOMMENDATIONS  PT Discharge Recommendations: Home([facility])     PLAN  Patient currently does not meet criteria for sk with syncopal episode while on toilet. Pt with UTI. Pt with history of TIA, HTN, A-fib, osteoporosis, compression fractures T12 and L2, bilateral THR. Pt with [facility] admission 11/2019 for UTI and L side weakness with dc back to [facility].      Problem List  Princip Patient Regularly Uses: None    Prior Level of [place]: Pt resident of [facility]. Pt reports ambulate frequently with rollator in halls and completes 40 min ex routine provided from previous PT daily. Pt ambulate to meals on her own.  Pt requires dana CMS Modifier (G-Code): CJ    FUNCTIONAL ABILITY STATUS  Gait Assessment   Gait Assistance: Supervision  Distance (ft): 150  Assistive Device: Rolling walker  Pattern: Within Functional Limits(slow chika)  Stoop/Curb Assistance: Not tested       Skilled T is below baseline and would benefit from skilled inpatient PT to address the above deficits to assist patient in returning to prior to level of function.   DISCHARGE RECOMMENDATIONS  PT Discharge Recommendations: Home(assisted living)    PLAN  PT Treatment THR. Pt with [facility] admission 11/2019 for UTI and L side weakness with dc back to [facility].      Problem List  Principal Problem:    Syncope, unspecified syncope type  Active Problems:    Syncope    Urinary tract infection with hematuria, site unspecified      Past ambulate frequently with rollator in halls and completes 40 min ex routine provided from previous PT daily. Pt ambulate to meals on her own. Pt requires some assist with Upper body dressing.      SUBJECTIVE  \"I'm glad you are here, I'm ready\"    Patient s Distance (ft): 150  Assistive Device: Rolling walker  Pattern: Within Functional Limits(slow chika)  Stoop/Curb Assistance: Not tested       Skilled Therapy Provided: Pt presents seated in chair, agreeable to PT erick. Pt concerned she does not have her w PLAN  PT Treatment Plan: Endurance; Energy conservation;Patient education;Gait training;Strengthening;Balance training;Transfer training  Rehab Potential : Good  Frequency (Obs): 5x/week  Number of Visits to Meet Established Goals: 2      CURRENT GOALS    G Presenting Problem: UTI, syncope[MS.2]    Physician Order: IP Consult to Occupational Therapy  Reason for Therapy: ADL/IADL Dysfunction and Discharge Planning    History related to current admission: Pt was admitted from home on 3/15 after she was found on • OTHER SURGICAL HISTORY  06/20/2018    Cysto - Dr Eri Mosher   • TONSILLECTOMY     • TOTAL HIP REPLACEMENT       left  7/07   , right 6/09[MS.2]       Im [facility] 45 SITUATION[MS. 1]  Type of Home: Assisted living facility  Home Layout: Multi-level NEUROLOGICAL FINDINGS                   ACTIVITY TOLERANCE                         O2 SATURATIONS                ACTIVITIES OF DAILY LIVING ASSESSMENT  AM-PAC ‘6-Clicks’ Inpatient Daily Activity Short Form  How much help from another person does the patien Specific performance deficits impacting engagement in ADL/IADL[MS.1] LOW  1 - 3 performance deficits[MS. 3]    Client Assessment/Performance Deficits[MS. 1] LOW - No comorbidities nor modifications of tasks[MS. 3]    Clinical Decision Making[MS. 1] LOW - Winter INFLUENZA 10/11/18     INFLUENZA 10/11/18     INFLUENZA 09/28/17     INFLUENZA 09/28/17     INFLUENZA 10/01/16     INFLUENZA 09/17/14     INFLUENZA 11/01/13     INFLUENZA 11/14/09     INFLUENZA 10/15/07     INFLUENZA 11/03/06     Pneumococcal (Prevnar 13)

## (undated) NOTE — ED AVS SNAPSHOT
[facility] Emergency Department    [place]  [address] [id number]    Phone:  [phone]    Fax:  [phone] [facility]   MRN: [id number]    Department:  [facility] Emergency Department   Date of Visit:  6/1 IF THERE IS ANY CHANGE OR WORSENING OF YOUR CONDITION, CALL YOUR PRIMARY CARE PHYSICIAN AT ONCE OR RETURN IMMEDIATELY TO THE EMERGENCY DEPARTMENT.     If you have been prescribed any medication(s), please fill your prescription right away and begin taking t

## (undated) NOTE — ED AVS SNAPSHOT
Jennifer Babcock   MRN: [id number]    Department:  [facility] Emergency Department   Date of Visit:  7/17/2019           Disclosure     Insurance plans vary and the physician(s) referred by the ER may not be covered by your plan.  Please contact yo tell this physician (or your personal doctor if your instructions are to return to your personal doctor) about any new or lasting problems. The primary care or specialist physician will see patients referred from the [facility] Emergency Department.  Jay Rhodes

## (undated) NOTE — ED AVS SNAPSHOT
Doris Haines   MRN: [id number]    Department:  [facility] Emergency Department   Date of Visit:  11/11/2017           Disclosure     Insurance plans vary and the physician(s) referred by the ER may not be covered by your plan.  Please contact y If you have been prescribed any medication(s), please fill your prescription right away and begin taking the medication(s) as directed    If the emergency physician has read X-rays, these will be re-interpreted by a radiologist.  If there is a significant

## (undated) NOTE — IP AVS SNAPSHOT
[address]            (For Outpatient Use Only) Initial Admit Date: 3/15/2020   Inpt/Obs Admit Date: Inpt: 3/15/20 / Obs: 03/16/20   Discharge Date:    Racheal Ramos:  [de-identified]   MRN: [de-identified]   CSN: [id number]   CEID: [id number] [employer]   Payor:  Plan:    Group Number:  Insurance Type:    Subscriber Name:  Subscriber :    Subscriber ID:  Pt Rel to Subscriber:    Hospital Account Financial Class: Medicare    2020

## (undated) NOTE — IP AVS SNAPSHOT
[facility] [place] [address] [place], [address] ~ [phone]                Discharge Summary   3/11/2017    Nilesh Mathew           Admission Information        Provider Department    3/11/2017 Jensen Holbrook MD  3ne-A Take 1 tablet (25 mg total) by mouth 2x Daily(Beta Blocker). Ran Frausto                              Rivaroxaban 20 MG Tabs   Last time this was given:  10 mg on 3/16/2017  5:03 PM   Commonly known as:  Neha Francisco   Next dose due:   Tomorrow in the mo [address] [facility] 256 upon discharge    Future Appointments     Apr 04, 2017  1:40 PM   HOSPITAL FOLLOW UP with [facility] ([facility])    [address] [id number] 0.2  (03/14/17)  4.34 (03/14/17)  0.78 (L) (03/14/17)  0.54 (03/14/17)  0.01 (03/14/17)  0.01    (03/13/17)  72.1 (03/13/17)  17.2 (03/13/17)  10.2 (03/13/17)  0.0 (03/13/17)  0.2  (03/13/17)  4.74 (03/13/17)  1.13 (03/13/17)  0.67 (H) (03/13/17)  0.00 (03 You can access your MyChart to more actively manage your health care and view more details from this visit by going to [email or web address]   If you've recently had a stay at the Hospital you can access your discharge instructions in [address] by monica Salicylates     Salicylates    LONGS ADULT LOW STRENGTH ASA 81 MG OR TBEC         Use:  “Thinning” of blood to prevent clotting within blood vessels, Pain relief   Most common side effects:  Bleeding, stomach upset   What to report to your healthcare team:

## (undated) NOTE — ED AVS SNAPSHOT
Maya Curiel   MRN: [id number]    Department:  [facility] Emergency Department   Date of Visit:  3/18/2018           Disclosure     Insurance plans vary and the physician(s) referred by the ER may not be covered by your plan.  Please contact yo tell this physician (or your personal doctor if your instructions are to return to your personal doctor) about any new or lasting problems. The primary care or specialist physician will see patients referred from the [facility] Emergency Department.  Wing Chapin